# Patient Record
Sex: FEMALE | Race: WHITE | Employment: OTHER | URBAN - METROPOLITAN AREA
[De-identification: names, ages, dates, MRNs, and addresses within clinical notes are randomized per-mention and may not be internally consistent; named-entity substitution may affect disease eponyms.]

---

## 2017-02-16 ENCOUNTER — HOSPITAL ENCOUNTER (OUTPATIENT)
Dept: GENERAL RADIOLOGY | Facility: HOSPITAL | Age: 77
Discharge: HOME | End: 2017-02-16
Attending: ORTHOPAEDIC SURGERY
Payer: MEDICARE

## 2017-02-16 DIAGNOSIS — Z96.611 HISTORY OF TOTAL SHOULDER REPLACEMENT, RIGHT: ICD-10-CM

## 2017-02-16 PROCEDURE — 73030 X-RAY EXAM OF SHOULDER: CPT

## 2017-08-03 ENCOUNTER — HOSPITAL ENCOUNTER (OUTPATIENT)
Dept: GENERAL RADIOLOGY | Facility: HOSPITAL | Age: 77
Discharge: HOME | End: 2017-08-03
Attending: ORTHOPAEDIC SURGERY
Payer: MEDICARE

## 2017-08-03 DIAGNOSIS — Z96.611 H/O TOTAL SHOULDER REPLACEMENT, RIGHT: ICD-10-CM

## 2017-08-03 PROCEDURE — 73030 X-RAY EXAM OF SHOULDER: CPT | Performed by: ORTHOPAEDIC SURGERY

## 2018-10-25 ENCOUNTER — HOSPITAL ENCOUNTER (OUTPATIENT)
Dept: GENERAL RADIOLOGY | Facility: HOSPITAL | Age: 78
Discharge: HOME | End: 2018-10-25
Attending: ORTHOPAEDIC SURGERY
Payer: MEDICARE

## 2018-10-25 DIAGNOSIS — Z96.611 H/O TOTAL SHOULDER REPLACEMENT, RIGHT: ICD-10-CM

## 2018-10-25 PROCEDURE — 73030 X-RAY EXAM OF SHOULDER: CPT | Performed by: ORTHOPAEDIC SURGERY

## 2021-06-15 ENCOUNTER — IMAGING SERVICES (OUTPATIENT)
Dept: OTHER | Age: 81
End: 2021-06-15

## 2022-04-27 ENCOUNTER — HOSPITAL ENCOUNTER (INPATIENT)
Age: 82
LOS: 3 days | Discharge: HOME | DRG: 390 | End: 2022-04-30
Attending: EMERGENCY MEDICINE | Admitting: INTERNAL MEDICINE

## 2022-04-27 ENCOUNTER — APPOINTMENT (OUTPATIENT)
Dept: CT IMAGING | Age: 82
DRG: 390 | End: 2022-04-27
Attending: EMERGENCY MEDICINE

## 2022-04-27 ENCOUNTER — APPOINTMENT (OUTPATIENT)
Dept: GENERAL RADIOLOGY | Age: 82
DRG: 390 | End: 2022-04-27
Attending: EMERGENCY MEDICINE

## 2022-04-27 DIAGNOSIS — K56.609 INTESTINAL OBSTRUCTION, UNSPECIFIED CAUSE, UNSPECIFIED WHETHER PARTIAL OR COMPLETE (CMD): Primary | ICD-10-CM

## 2022-04-27 LAB
ALBUMIN SERPL-MCNC: 4.1 G/DL (ref 3.6–5.1)
ALBUMIN/GLOB SERPL: 1.2 {RATIO} (ref 1–2.4)
ALP SERPL-CCNC: 66 UNITS/L (ref 45–117)
ALT SERPL-CCNC: 40 UNITS/L
ANION GAP SERPL CALC-SCNC: 14 MMOL/L (ref 10–20)
AST SERPL-CCNC: 26 UNITS/L
BASOPHILS # BLD: 0 K/MCL (ref 0–0.3)
BASOPHILS NFR BLD: 1 %
BILIRUB SERPL-MCNC: 0.4 MG/DL (ref 0.2–1)
BUN SERPL-MCNC: 21 MG/DL (ref 6–20)
BUN/CREAT SERPL: 27 (ref 7–25)
CALCIUM SERPL-MCNC: 9.3 MG/DL (ref 8.4–10.2)
CHLORIDE SERPL-SCNC: 103 MMOL/L (ref 98–107)
CO2 SERPL-SCNC: 26 MMOL/L (ref 21–32)
CREAT SERPL-MCNC: 0.78 MG/DL (ref 0.51–0.95)
DEPRECATED RDW RBC: 41.9 FL (ref 39–50)
EOSINOPHIL # BLD: 0.1 K/MCL (ref 0–0.5)
EOSINOPHIL NFR BLD: 2 %
ERYTHROCYTE [DISTWIDTH] IN BLOOD: 12.1 % (ref 11–15)
FASTING DURATION TIME PATIENT: ABNORMAL H
GFR SERPLBLD BASED ON 1.73 SQ M-ARVRAT: 77 ML/MIN
GLOBULIN SER-MCNC: 3.4 G/DL (ref 2–4)
GLUCOSE SERPL-MCNC: 134 MG/DL (ref 70–99)
HCT VFR BLD CALC: 42.3 % (ref 36–46.5)
HGB BLD-MCNC: 14.7 G/DL (ref 12–15.5)
IMM GRANULOCYTES # BLD AUTO: 0 K/MCL (ref 0–0.2)
IMM GRANULOCYTES # BLD: 0 %
LACTATE BLDV-SCNC: 0.7 MMOL/L (ref 0–2)
LIPASE SERPL-CCNC: 94 UNITS/L (ref 73–393)
LYMPHOCYTES # BLD: 1.9 K/MCL (ref 1–4)
LYMPHOCYTES NFR BLD: 30 %
MCH RBC QN AUTO: 32.5 PG (ref 26–34)
MCHC RBC AUTO-ENTMCNC: 34.8 G/DL (ref 32–36.5)
MCV RBC AUTO: 93.4 FL (ref 78–100)
MONOCYTES # BLD: 0.5 K/MCL (ref 0.3–0.9)
MONOCYTES NFR BLD: 8 %
NEUTROPHILS # BLD: 3.8 K/MCL (ref 1.8–7.7)
NEUTROPHILS NFR BLD: 59 %
NRBC BLD MANUAL-RTO: 0 /100 WBC
PLATELET # BLD AUTO: 156 K/MCL (ref 140–450)
POTASSIUM SERPL-SCNC: 4.2 MMOL/L (ref 3.4–5.1)
PROT SERPL-MCNC: 7.5 G/DL (ref 6.4–8.2)
RBC # BLD: 4.53 MIL/MCL (ref 4–5.2)
SODIUM SERPL-SCNC: 139 MMOL/L (ref 135–145)
WBC # BLD: 6.4 K/MCL (ref 4.2–11)

## 2022-04-27 PROCEDURE — 93005 ELECTROCARDIOGRAM TRACING: CPT | Performed by: EMERGENCY MEDICINE

## 2022-04-27 PROCEDURE — 10006031 HB ROOM CHARGE TELEMETRY

## 2022-04-27 PROCEDURE — 83605 ASSAY OF LACTIC ACID: CPT | Performed by: EMERGENCY MEDICINE

## 2022-04-27 PROCEDURE — 10002807 HB RX 258: Performed by: EMERGENCY MEDICINE

## 2022-04-27 PROCEDURE — 96374 THER/PROPH/DIAG INJ IV PUSH: CPT

## 2022-04-27 PROCEDURE — G1004 CDSM NDSC: HCPCS

## 2022-04-27 PROCEDURE — 74176 CT ABD & PELVIS W/O CONTRAST: CPT

## 2022-04-27 PROCEDURE — 93010 ELECTROCARDIOGRAM REPORT: CPT | Performed by: INTERNAL MEDICINE

## 2022-04-27 PROCEDURE — 74018 RADEX ABDOMEN 1 VIEW: CPT

## 2022-04-27 PROCEDURE — 99285 EMERGENCY DEPT VISIT HI MDM: CPT

## 2022-04-27 PROCEDURE — U0003 INFECTIOUS AGENT DETECTION BY NUCLEIC ACID (DNA OR RNA); SEVERE ACUTE RESPIRATORY SYNDROME CORONAVIRUS 2 (SARS-COV-2) (CORONAVIRUS DISEASE [COVID-19]), AMPLIFIED PROBE TECHNIQUE, MAKING USE OF HIGH THROUGHPUT TECHNOLOGIES AS DESCRIBED BY CMS-2020-01-R: HCPCS | Performed by: EMERGENCY MEDICINE

## 2022-04-27 PROCEDURE — 83690 ASSAY OF LIPASE: CPT | Performed by: EMERGENCY MEDICINE

## 2022-04-27 PROCEDURE — 10002800 HB RX 250 W HCPCS: Performed by: EMERGENCY MEDICINE

## 2022-04-27 PROCEDURE — 96375 TX/PRO/DX INJ NEW DRUG ADDON: CPT

## 2022-04-27 PROCEDURE — 85025 COMPLETE CBC W/AUTO DIFF WBC: CPT | Performed by: EMERGENCY MEDICINE

## 2022-04-27 PROCEDURE — 80053 COMPREHEN METABOLIC PANEL: CPT | Performed by: EMERGENCY MEDICINE

## 2022-04-27 RX ORDER — ONDANSETRON 2 MG/ML
4 INJECTION INTRAMUSCULAR; INTRAVENOUS 2 TIMES DAILY PRN
Status: DISCONTINUED | OUTPATIENT
Start: 2022-04-27 | End: 2022-04-27

## 2022-04-27 RX ORDER — ONDANSETRON 2 MG/ML
4 INJECTION INTRAMUSCULAR; INTRAVENOUS ONCE
Status: COMPLETED | OUTPATIENT
Start: 2022-04-27 | End: 2022-04-27

## 2022-04-27 RX ORDER — ONDANSETRON 2 MG/ML
4 INJECTION INTRAMUSCULAR; INTRAVENOUS EVERY 4 HOURS PRN
Status: DISCONTINUED | OUTPATIENT
Start: 2022-04-27 | End: 2022-04-30 | Stop reason: HOSPADM

## 2022-04-27 RX ORDER — ONDANSETRON 2 MG/ML
4 INJECTION INTRAMUSCULAR; INTRAVENOUS EVERY 6 HOURS PRN
Status: DISCONTINUED | OUTPATIENT
Start: 2022-04-27 | End: 2022-04-27

## 2022-04-27 RX ORDER — SODIUM CHLORIDE 9 MG/ML
INJECTION, SOLUTION INTRAVENOUS CONTINUOUS
Status: DISCONTINUED | OUTPATIENT
Start: 2022-04-27 | End: 2022-04-30 | Stop reason: HOSPADM

## 2022-04-27 RX ADMIN — SODIUM CHLORIDE 1000 ML: 9 INJECTION, SOLUTION INTRAVENOUS at 22:18

## 2022-04-27 RX ADMIN — MORPHINE SULFATE 2 MG: 2 INJECTION, SOLUTION INTRAMUSCULAR; INTRAVENOUS at 22:06

## 2022-04-27 RX ADMIN — ONDANSETRON 4 MG: 2 INJECTION INTRAMUSCULAR; INTRAVENOUS at 21:20

## 2022-04-27 RX ADMIN — SODIUM CHLORIDE 1000 ML: 9 INJECTION, SOLUTION INTRAVENOUS at 22:16

## 2022-04-27 ASSESSMENT — ENCOUNTER SYMPTOMS
CHEST TIGHTNESS: 0
BLOOD IN STOOL: 0
HEMATOLOGIC/LYMPHATIC NEGATIVE: 1
CONSTIPATION: 0
SHORTNESS OF BREATH: 0
HEADACHES: 0
DIARRHEA: 0
NEUROLOGICAL NEGATIVE: 1
PSYCHIATRIC NEGATIVE: 1
NAUSEA: 1
ENDOCRINE NEGATIVE: 1
ALLERGIC/IMMUNOLOGIC NEGATIVE: 1
ABDOMINAL PAIN: 1
BACK PAIN: 0
CONSTITUTIONAL NEGATIVE: 1
VOMITING: 1
RESPIRATORY NEGATIVE: 1
WEAKNESS: 0
EYES NEGATIVE: 1

## 2022-04-27 ASSESSMENT — LIFESTYLE VARIABLES
ALCOHOL_USE_STATUS: NO OR LOW RISK WITH VALIDATED TOOL
HOW OFTEN DO YOU HAVE A DRINK CONTAINING ALCOHOL: NEVER
HOW OFTEN DO YOU HAVE 6 OR MORE DRINKS ON ONE OCCASION: NEVER

## 2022-04-27 ASSESSMENT — ACTIVITIES OF DAILY LIVING (ADL)
ADL_BEFORE_ADMISSION: INDEPENDENT
CHRONIC_PAIN_PRESENT: NO
RECENT_DECLINE_ADL: NO
ADL_SCORE: 12
ADL_SHORT_OF_BREATH: NO

## 2022-04-27 ASSESSMENT — PAIN SCALES - GENERAL: PAINLEVEL_OUTOF10: 9

## 2022-04-27 ASSESSMENT — COGNITIVE AND FUNCTIONAL STATUS - GENERAL: ARE YOU DEAF OR DO YOU HAVE SERIOUS DIFFICULTY  HEARING: NO

## 2022-04-28 PROBLEM — K59.01 SLOW TRANSIT CONSTIPATION: Status: ACTIVE | Noted: 2022-04-28

## 2022-04-28 LAB
ALBUMIN SERPL-MCNC: 3.1 G/DL (ref 3.6–5.1)
ALBUMIN/GLOB SERPL: 1.1 {RATIO} (ref 1–2.4)
ALP SERPL-CCNC: 50 UNITS/L (ref 45–117)
ALT SERPL-CCNC: 32 UNITS/L
ANION GAP SERPL CALC-SCNC: 11 MMOL/L (ref 10–20)
APPEARANCE UR: CLEAR
AST SERPL-CCNC: 24 UNITS/L
BACTERIA #/AREA URNS HPF: ABNORMAL /HPF
BILIRUB SERPL-MCNC: 0.5 MG/DL (ref 0.2–1)
BILIRUB UR QL STRIP: NEGATIVE
BUN SERPL-MCNC: 17 MG/DL (ref 6–20)
BUN/CREAT SERPL: 26 (ref 7–25)
CALCIUM SERPL-MCNC: 8.2 MG/DL (ref 8.4–10.2)
CHLORIDE SERPL-SCNC: 107 MMOL/L (ref 98–107)
CO2 SERPL-SCNC: 24 MMOL/L (ref 21–32)
COLOR UR: YELLOW
CREAT SERPL-MCNC: 0.66 MG/DL (ref 0.51–0.95)
DEPRECATED RDW RBC: 41.6 FL (ref 39–50)
ERYTHROCYTE [DISTWIDTH] IN BLOOD: 12.2 % (ref 11–15)
FASTING DURATION TIME PATIENT: ABNORMAL H
GFR SERPLBLD BASED ON 1.73 SQ M-ARVRAT: 89 ML/MIN
GLOBULIN SER-MCNC: 2.9 G/DL (ref 2–4)
GLUCOSE SERPL-MCNC: 118 MG/DL (ref 70–99)
GLUCOSE UR STRIP-MCNC: NEGATIVE MG/DL
HCT VFR BLD CALC: 39.3 % (ref 36–46.5)
HGB BLD-MCNC: 13.4 G/DL (ref 12–15.5)
HGB UR QL STRIP: NEGATIVE
HYALINE CASTS #/AREA URNS LPF: ABNORMAL /LPF
KETONES UR STRIP-MCNC: NEGATIVE MG/DL
LEUKOCYTE ESTERASE UR QL STRIP: ABNORMAL
MAGNESIUM SERPL-MCNC: 1.9 MG/DL (ref 1.7–2.4)
MCH RBC QN AUTO: 31.5 PG (ref 26–34)
MCHC RBC AUTO-ENTMCNC: 34.1 G/DL (ref 32–36.5)
MCV RBC AUTO: 92.5 FL (ref 78–100)
NITRITE UR QL STRIP: NEGATIVE
NRBC BLD MANUAL-RTO: 0 /100 WBC
PH UR STRIP: 6 [PH] (ref 5–7)
PHOSPHATE SERPL-MCNC: 3.9 MG/DL (ref 2.4–4.7)
PLATELET # BLD AUTO: 154 K/MCL (ref 140–450)
POTASSIUM SERPL-SCNC: 4.2 MMOL/L (ref 3.4–5.1)
PROT SERPL-MCNC: 6 G/DL (ref 6.4–8.2)
PROT UR STRIP-MCNC: NEGATIVE MG/DL
RBC # BLD: 4.25 MIL/MCL (ref 4–5.2)
RBC #/AREA URNS HPF: ABNORMAL /HPF
SARS-COV-2 RNA RESP QL NAA+PROBE: NOT DETECTED
SERVICE CMNT-IMP: NORMAL
SERVICE CMNT-IMP: NORMAL
SODIUM SERPL-SCNC: 138 MMOL/L (ref 135–145)
SP GR UR STRIP: 1.01 (ref 1–1.03)
SQUAMOUS #/AREA URNS HPF: ABNORMAL /HPF
UROBILINOGEN UR STRIP-MCNC: 0.2 MG/DL
WBC # BLD: 6.9 K/MCL (ref 4.2–11)
WBC #/AREA URNS HPF: ABNORMAL /HPF

## 2022-04-28 PROCEDURE — 84100 ASSAY OF PHOSPHORUS: CPT | Performed by: INTERNAL MEDICINE

## 2022-04-28 PROCEDURE — 36415 COLL VENOUS BLD VENIPUNCTURE: CPT | Performed by: INTERNAL MEDICINE

## 2022-04-28 PROCEDURE — 81001 URINALYSIS AUTO W/SCOPE: CPT | Performed by: EMERGENCY MEDICINE

## 2022-04-28 PROCEDURE — 10000002 HB ROOM CHARGE MED SURG

## 2022-04-28 PROCEDURE — 10002807 HB RX 258: Performed by: INTERNAL MEDICINE

## 2022-04-28 PROCEDURE — 83735 ASSAY OF MAGNESIUM: CPT | Performed by: INTERNAL MEDICINE

## 2022-04-28 PROCEDURE — 87086 URINE CULTURE/COLONY COUNT: CPT | Performed by: EMERGENCY MEDICINE

## 2022-04-28 PROCEDURE — 80053 COMPREHEN METABOLIC PANEL: CPT | Performed by: INTERNAL MEDICINE

## 2022-04-28 PROCEDURE — 85027 COMPLETE CBC AUTOMATED: CPT | Performed by: INTERNAL MEDICINE

## 2022-04-28 PROCEDURE — 99223 1ST HOSP IP/OBS HIGH 75: CPT | Performed by: INTERNAL MEDICINE

## 2022-04-28 PROCEDURE — 99497 ADVNCD CARE PLAN 30 MIN: CPT | Performed by: INTERNAL MEDICINE

## 2022-04-28 RX ORDER — MULTIVIT WITH MINERALS/LUTEIN
1000 TABLET ORAL 2 TIMES DAILY
COMMUNITY

## 2022-04-28 RX ORDER — CHLORAL HYDRATE 500 MG
1000 CAPSULE ORAL 2 TIMES DAILY
COMMUNITY

## 2022-04-28 RX ORDER — ANASTROZOLE 1 MG/1
1 TABLET ORAL DAILY
Status: ON HOLD | COMMUNITY
End: 2023-09-02

## 2022-04-28 RX ADMIN — SODIUM CHLORIDE: 9 INJECTION, SOLUTION INTRAVENOUS at 01:36

## 2022-04-28 RX ADMIN — SODIUM CHLORIDE: 9 INJECTION, SOLUTION INTRAVENOUS at 22:37

## 2022-04-28 ASSESSMENT — COLUMBIA-SUICIDE SEVERITY RATING SCALE - C-SSRS: IS THE PATIENT ABLE TO COMPLETE C-SSRS: YES

## 2022-04-28 ASSESSMENT — PATIENT HEALTH QUESTIONNAIRE - PHQ9
SUM OF ALL RESPONSES TO PHQ9 QUESTIONS 1 AND 2: 0
1. LITTLE INTEREST OR PLEASURE IN DOING THINGS: NOT AT ALL
CLINICAL INTERPRETATION OF PHQ2 SCORE: NO FURTHER SCREENING NEEDED
2. FEELING DOWN, DEPRESSED OR HOPELESS: NOT AT ALL
IS PATIENT ABLE TO COMPLETE PHQ2 OR PHQ9: YES
SUM OF ALL RESPONSES TO PHQ9 QUESTIONS 1 AND 2: 0

## 2022-04-28 ASSESSMENT — PAIN SCALES - GENERAL
PAINLEVEL_OUTOF10: 5
PAINLEVEL_OUTOF10: 1
PAINLEVEL_OUTOF10: 2

## 2022-04-29 LAB
BACTERIA UR CULT: NORMAL
P AXIS (DEGREES): 74
PR-INTERVAL (MSEC): 168
QRS-INTERVAL (MSEC): 84
QT-INTERVAL (MSEC): 392
QTC: 398
R AXIS (DEGREES): 62
REPORT TEXT: NORMAL
T AXIS (DEGREES): 60
VENTRICULAR RATE EKG/MIN (BPM): 62

## 2022-04-29 PROCEDURE — 99233 SBSQ HOSP IP/OBS HIGH 50: CPT | Performed by: INTERNAL MEDICINE

## 2022-04-29 PROCEDURE — 10002807 HB RX 258: Performed by: INTERNAL MEDICINE

## 2022-04-29 PROCEDURE — 10000002 HB ROOM CHARGE MED SURG

## 2022-04-29 PROCEDURE — 99232 SBSQ HOSP IP/OBS MODERATE 35: CPT | Performed by: SURGERY

## 2022-04-29 RX ADMIN — SODIUM CHLORIDE: 9 INJECTION, SOLUTION INTRAVENOUS at 23:00

## 2022-04-29 RX ADMIN — SODIUM CHLORIDE: 9 INJECTION, SOLUTION INTRAVENOUS at 11:07

## 2022-04-29 ASSESSMENT — PAIN SCALES - GENERAL
PAINLEVEL_OUTOF10: 0
PAINLEVEL_OUTOF10: 0

## 2022-04-30 VITALS
BODY MASS INDEX: 20.48 KG/M2 | SYSTOLIC BLOOD PRESSURE: 119 MMHG | DIASTOLIC BLOOD PRESSURE: 71 MMHG | HEIGHT: 66 IN | HEART RATE: 80 BPM | TEMPERATURE: 97.9 F | RESPIRATION RATE: 18 BRPM | WEIGHT: 127.43 LBS | OXYGEN SATURATION: 94 %

## 2022-04-30 PROCEDURE — 99239 HOSP IP/OBS DSCHRG MGMT >30: CPT | Performed by: INTERNAL MEDICINE

## 2022-04-30 ASSESSMENT — PAIN SCALES - GENERAL: PAINLEVEL_OUTOF10: 0

## 2022-05-27 ENCOUNTER — HOSPITAL ENCOUNTER (OUTPATIENT)
Dept: ULTRASOUND IMAGING | Age: 82
Discharge: HOME | End: 2022-05-27
Attending: FAMILY MEDICINE

## 2022-05-27 DIAGNOSIS — N28.9 KIDNEY LESION: ICD-10-CM

## 2022-05-27 PROCEDURE — 76775 US EXAM ABDO BACK WALL LIM: CPT

## 2022-08-18 ENCOUNTER — HOSPITAL ENCOUNTER (OUTPATIENT)
Age: 82
Setting detail: OBSERVATION
Discharge: HOME | End: 2022-08-19
Attending: EMERGENCY MEDICINE | Admitting: INTERNAL MEDICINE

## 2022-08-18 ENCOUNTER — APPOINTMENT (OUTPATIENT)
Dept: CARDIOLOGY | Age: 82
End: 2022-08-18
Attending: INTERNAL MEDICINE

## 2022-08-18 ENCOUNTER — APPOINTMENT (OUTPATIENT)
Dept: CT IMAGING | Age: 82
End: 2022-08-18
Attending: EMERGENCY MEDICINE

## 2022-08-18 ENCOUNTER — APPOINTMENT (OUTPATIENT)
Dept: GENERAL RADIOLOGY | Age: 82
End: 2022-08-18
Attending: EMERGENCY MEDICINE

## 2022-08-18 DIAGNOSIS — I48.91 ATRIAL FIBRILLATION, UNSPECIFIED TYPE (CMD): Primary | ICD-10-CM

## 2022-08-18 DIAGNOSIS — K59.01 SLOW TRANSIT CONSTIPATION: ICD-10-CM

## 2022-08-18 LAB
ALBUMIN SERPL-MCNC: 3.3 G/DL (ref 3.6–5.1)
ALBUMIN/GLOB SERPL: 1.1 {RATIO} (ref 1–2.4)
ALP SERPL-CCNC: 50 UNITS/L (ref 45–117)
ALT SERPL-CCNC: 28 UNITS/L
ANION GAP SERPL CALC-SCNC: 16 MMOL/L (ref 7–19)
APTT PPP: 23 SEC (ref 22–30)
AST SERPL-CCNC: 25 UNITS/L
BASOPHILS # BLD: 0 K/MCL (ref 0–0.3)
BASOPHILS NFR BLD: 1 %
BILIRUB SERPL-MCNC: 0.5 MG/DL (ref 0.2–1)
BUN SERPL-MCNC: 19 MG/DL (ref 6–20)
BUN/CREAT SERPL: 26 (ref 7–25)
CALCIUM SERPL-MCNC: 8.4 MG/DL (ref 8.4–10.2)
CHLORIDE SERPL-SCNC: 103 MMOL/L (ref 97–110)
CO2 SERPL-SCNC: 22 MMOL/L (ref 21–32)
CREAT SERPL-MCNC: 0.74 MG/DL (ref 0.51–0.95)
DEPRECATED RDW RBC: 42.6 FL (ref 39–50)
EOSINOPHIL # BLD: 0.2 K/MCL (ref 0–0.5)
EOSINOPHIL NFR BLD: 3 %
ERYTHROCYTE [DISTWIDTH] IN BLOOD: 12.1 % (ref 11–15)
FASTING DURATION TIME PATIENT: ABNORMAL H
GFR SERPLBLD BASED ON 1.73 SQ M-ARVRAT: 82 ML/MIN
GLOBULIN SER-MCNC: 3 G/DL (ref 2–4)
GLUCOSE SERPL-MCNC: 113 MG/DL (ref 70–99)
HCT VFR BLD CALC: 41.9 % (ref 36–46.5)
HGB BLD-MCNC: 14.2 G/DL (ref 12–15.5)
IMM GRANULOCYTES # BLD AUTO: 0 K/MCL (ref 0–0.2)
IMM GRANULOCYTES # BLD: 0 %
INR PPP: 1
LIPASE SERPL-CCNC: 116 UNITS/L (ref 73–393)
LYMPHOCYTES # BLD: 1.8 K/MCL (ref 1–4)
LYMPHOCYTES NFR BLD: 35 %
MCH RBC QN AUTO: 32.1 PG (ref 26–34)
MCHC RBC AUTO-ENTMCNC: 33.9 G/DL (ref 32–36.5)
MCV RBC AUTO: 94.8 FL (ref 78–100)
MONOCYTES # BLD: 0.4 K/MCL (ref 0.3–0.9)
MONOCYTES NFR BLD: 8 %
NEUTROPHILS # BLD: 2.7 K/MCL (ref 1.8–7.7)
NEUTROPHILS NFR BLD: 53 %
NRBC BLD MANUAL-RTO: 0 /100 WBC
NT-PROBNP SERPL-MCNC: 374 PG/ML
PLATELET # BLD AUTO: 151 K/MCL (ref 140–450)
POTASSIUM SERPL-SCNC: 3.6 MMOL/L (ref 3.4–5.1)
PROT SERPL-MCNC: 6.3 G/DL (ref 6.4–8.2)
PROTHROMBIN TIME: 10.8 SEC (ref 9.7–11.8)
QRS-INTERVAL (MSEC): 93
QT-INTERVAL (MSEC): 289
QTC: 369
R AXIS (DEGREES): 52
RBC # BLD: 4.42 MIL/MCL (ref 4–5.2)
REPORT TEXT: NORMAL
SARS-COV-2 RNA RESP QL NAA+PROBE: NOT DETECTED
SERVICE CMNT-IMP: NORMAL
SERVICE CMNT-IMP: NORMAL
SODIUM SERPL-SCNC: 137 MMOL/L (ref 135–145)
T AXIS (DEGREES): 48
TROPONIN I SERPL DL<=0.01 NG/ML-MCNC: 33 NG/L
VENTRICULAR RATE EKG/MIN (BPM): 137
WBC # BLD: 5.1 K/MCL (ref 4.2–11)

## 2022-08-18 PROCEDURE — 96361 HYDRATE IV INFUSION ADD-ON: CPT

## 2022-08-18 PROCEDURE — G1004 CDSM NDSC: HCPCS

## 2022-08-18 PROCEDURE — 93306 TTE W/DOPPLER COMPLETE: CPT

## 2022-08-18 PROCEDURE — 83690 ASSAY OF LIPASE: CPT | Performed by: EMERGENCY MEDICINE

## 2022-08-18 PROCEDURE — 10004651 HB RX, NO CHARGE ITEM: Performed by: EMERGENCY MEDICINE

## 2022-08-18 PROCEDURE — 96366 THER/PROPH/DIAG IV INF ADDON: CPT

## 2022-08-18 PROCEDURE — G0378 HOSPITAL OBSERVATION PER HR: HCPCS

## 2022-08-18 PROCEDURE — 84484 ASSAY OF TROPONIN QUANT: CPT | Performed by: EMERGENCY MEDICINE

## 2022-08-18 PROCEDURE — 83880 ASSAY OF NATRIURETIC PEPTIDE: CPT | Performed by: NURSE PRACTITIONER

## 2022-08-18 PROCEDURE — 85730 THROMBOPLASTIN TIME PARTIAL: CPT | Performed by: EMERGENCY MEDICINE

## 2022-08-18 PROCEDURE — 85025 COMPLETE CBC W/AUTO DIFF WBC: CPT | Performed by: EMERGENCY MEDICINE

## 2022-08-18 PROCEDURE — 96376 TX/PRO/DX INJ SAME DRUG ADON: CPT

## 2022-08-18 PROCEDURE — 80053 COMPREHEN METABOLIC PANEL: CPT | Performed by: EMERGENCY MEDICINE

## 2022-08-18 PROCEDURE — 85610 PROTHROMBIN TIME: CPT | Performed by: EMERGENCY MEDICINE

## 2022-08-18 PROCEDURE — 93005 ELECTROCARDIOGRAM TRACING: CPT | Performed by: EMERGENCY MEDICINE

## 2022-08-18 PROCEDURE — 99285 EMERGENCY DEPT VISIT HI MDM: CPT

## 2022-08-18 PROCEDURE — 10002803 HB RX 637: Performed by: INTERNAL MEDICINE

## 2022-08-18 PROCEDURE — 10002807 HB RX 258: Performed by: EMERGENCY MEDICINE

## 2022-08-18 PROCEDURE — 96365 THER/PROPH/DIAG IV INF INIT: CPT

## 2022-08-18 PROCEDURE — 93306 TTE W/DOPPLER COMPLETE: CPT | Performed by: INTERNAL MEDICINE

## 2022-08-18 PROCEDURE — 10002803 HB RX 637: Performed by: STUDENT IN AN ORGANIZED HEALTH CARE EDUCATION/TRAINING PROGRAM

## 2022-08-18 PROCEDURE — 71045 X-RAY EXAM CHEST 1 VIEW: CPT

## 2022-08-18 PROCEDURE — 10002801 HB RX 250 W/O HCPCS: Performed by: EMERGENCY MEDICINE

## 2022-08-18 PROCEDURE — 70450 CT HEAD/BRAIN W/O DYE: CPT

## 2022-08-18 PROCEDURE — 99205 OFFICE O/P NEW HI 60 MIN: CPT | Performed by: INTERNAL MEDICINE

## 2022-08-18 PROCEDURE — 99220 INITIAL OBSERVATION CARE,LEVL III: CPT | Performed by: STUDENT IN AN ORGANIZED HEALTH CARE EDUCATION/TRAINING PROGRAM

## 2022-08-18 PROCEDURE — 87635 SARS-COV-2 COVID-19 AMP PRB: CPT | Performed by: EMERGENCY MEDICINE

## 2022-08-18 PROCEDURE — 96374 THER/PROPH/DIAG INJ IV PUSH: CPT

## 2022-08-18 RX ORDER — ANTIARTHRITIC COMBINATION NO.2 900 MG
25 TABLET ORAL DAILY
Status: ON HOLD | COMMUNITY
End: 2023-09-03 | Stop reason: HOSPADM

## 2022-08-18 RX ORDER — ASCORBIC ACID 500 MG
500 TABLET ORAL DAILY
Status: DISCONTINUED | OUTPATIENT
Start: 2022-08-18 | End: 2022-08-19 | Stop reason: HOSPADM

## 2022-08-18 RX ORDER — EVENING PRIMROSE OIL 500 MG
500 CAPSULE ORAL 4 TIMES DAILY
COMMUNITY

## 2022-08-18 RX ORDER — ACETAMINOPHEN 325 MG/1
650 TABLET ORAL EVERY 4 HOURS PRN
Status: DISCONTINUED | OUTPATIENT
Start: 2022-08-18 | End: 2022-08-19 | Stop reason: HOSPADM

## 2022-08-18 RX ORDER — PYRIDOXINE HCL (VITAMIN B6) 50 MG
100 TABLET ORAL 2 TIMES DAILY
Status: DISCONTINUED | OUTPATIENT
Start: 2022-08-18 | End: 2022-08-19 | Stop reason: HOSPADM

## 2022-08-18 RX ORDER — MAGNESIUM HYDROXIDE/ALUMINUM HYDROXICE/SIMETHICONE 120; 1200; 1200 MG/30ML; MG/30ML; MG/30ML
30 SUSPENSION ORAL EVERY 4 HOURS PRN
Status: DISCONTINUED | OUTPATIENT
Start: 2022-08-18 | End: 2022-08-19 | Stop reason: HOSPADM

## 2022-08-18 RX ORDER — ANASTROZOLE 1 MG/1
1 TABLET ORAL DAILY
Status: DISCONTINUED | OUTPATIENT
Start: 2022-08-18 | End: 2022-08-19 | Stop reason: HOSPADM

## 2022-08-18 RX ORDER — MULTIVITAMIN WITH IRON
100 TABLET ORAL 2 TIMES DAILY
COMMUNITY

## 2022-08-18 RX ORDER — ENOXAPARIN SODIUM 100 MG/ML
40 INJECTION SUBCUTANEOUS EVERY 24 HOURS
Status: DISCONTINUED | OUTPATIENT
Start: 2022-08-18 | End: 2022-08-18

## 2022-08-18 RX ORDER — AMOXICILLIN 250 MG
2 CAPSULE ORAL DAILY PRN
Status: DISCONTINUED | OUTPATIENT
Start: 2022-08-18 | End: 2022-08-19 | Stop reason: HOSPADM

## 2022-08-18 RX ORDER — LANOLIN ALCOHOL/MO/W.PET/CERES
3 CREAM (GRAM) TOPICAL NIGHTLY PRN
Status: DISCONTINUED | OUTPATIENT
Start: 2022-08-18 | End: 2022-08-19 | Stop reason: HOSPADM

## 2022-08-18 RX ORDER — POLYETHYLENE GLYCOL 3350 17 G/17G
17 POWDER, FOR SOLUTION ORAL DAILY PRN
Status: DISCONTINUED | OUTPATIENT
Start: 2022-08-18 | End: 2022-08-19 | Stop reason: HOSPADM

## 2022-08-18 RX ORDER — ONDANSETRON 2 MG/ML
4 INJECTION INTRAMUSCULAR; INTRAVENOUS 4 TIMES DAILY PRN
Status: DISCONTINUED | OUTPATIENT
Start: 2022-08-18 | End: 2022-08-19 | Stop reason: HOSPADM

## 2022-08-18 RX ORDER — CALCIUM CARBONATE 500 MG/1
500 TABLET, CHEWABLE ORAL EVERY 4 HOURS PRN
Status: DISCONTINUED | OUTPATIENT
Start: 2022-08-18 | End: 2022-08-19 | Stop reason: HOSPADM

## 2022-08-18 RX ORDER — 0.9 % SODIUM CHLORIDE 0.9 %
2 VIAL (ML) INJECTION EVERY 12 HOURS SCHEDULED
Status: DISCONTINUED | OUTPATIENT
Start: 2022-08-18 | End: 2022-08-19 | Stop reason: HOSPADM

## 2022-08-18 RX ADMIN — SODIUM CHLORIDE, PRESERVATIVE FREE 2 ML: 5 INJECTION INTRAVENOUS at 20:50

## 2022-08-18 RX ADMIN — RIVAROXABAN 20 MG: 20 TABLET, FILM COATED ORAL at 18:03

## 2022-08-18 RX ADMIN — SODIUM CHLORIDE, PRESERVATIVE FREE 2 ML: 5 INJECTION INTRAVENOUS at 10:23

## 2022-08-18 RX ADMIN — SODIUM CHLORIDE 1000 ML: 9 INJECTION, SOLUTION INTRAVENOUS at 05:16

## 2022-08-18 RX ADMIN — Medication 5 MG/HR: at 20:53

## 2022-08-18 RX ADMIN — Medication 5 MG/HR: at 05:12

## 2022-08-18 RX ADMIN — Medication 100 MG: at 20:50

## 2022-08-18 ASSESSMENT — ENCOUNTER SYMPTOMS
VOMITING: 0
COUGH: 0
SORE THROAT: 0
FEVER: 0
RHINORRHEA: 0
HEADACHES: 0
CHILLS: 0
NAUSEA: 0
HEADACHES: 1
NERVOUS/ANXIOUS: 1
CONFUSION: 0
NUMBNESS: 1
ABDOMINAL PAIN: 0
WOUND: 0
SHORTNESS OF BREATH: 0
SHORTNESS OF BREATH: 1

## 2022-08-18 ASSESSMENT — COGNITIVE AND FUNCTIONAL STATUS - GENERAL
ARE YOU BLIND OR DO YOU HAVE SERIOUS DIFFICULTY SEEING, EVEN WHEN WEARING GLASSES: NO
DO YOU HAVE SERIOUS DIFFICULTY WALKING OR CLIMBING STAIRS: NO
DO YOU HAVE DIFFICULTY DRESSING OR BATHING: NO
BECAUSE OF A PHYSICAL, MENTAL, OR EMOTIONAL CONDITION, DO YOU HAVE DIFFICULTY DOING ERRANDS ALONE: NO
ARE YOU DEAF OR DO YOU HAVE SERIOUS DIFFICULTY  HEARING: NO
BECAUSE OF A PHYSICAL, MENTAL, OR EMOTIONAL CONDITION, DO YOU HAVE SERIOUS DIFFICULTY CONCENTRATING, REMEMBERING OR MAKING DECISIONS: NO

## 2022-08-18 ASSESSMENT — PAIN SCALES - GENERAL
PAINLEVEL_OUTOF10: 3
PAINLEVEL_OUTOF10: 0
PAINLEVEL_OUTOF10: 0

## 2022-08-18 ASSESSMENT — ACTIVITIES OF DAILY LIVING (ADL)
ADL_BEFORE_ADMISSION: INDEPENDENT
RECENT_DECLINE_ADL: NO
ADL_SCORE: 12
ADL_SHORT_OF_BREATH: NO
CHRONIC_PAIN_PRESENT: NO

## 2022-08-18 ASSESSMENT — LIFESTYLE VARIABLES
HOW MANY STANDARD DRINKS CONTAINING ALCOHOL DO YOU HAVE ON A TYPICAL DAY: 0,1 OR 2
HOW OFTEN DO YOU HAVE A DRINK CONTAINING ALCOHOL: NEVER
AUDIT-C TOTAL SCORE: 0
HOW OFTEN DO YOU HAVE 6 OR MORE DRINKS ON ONE OCCASION: NEVER
ALCOHOL_USE_STATUS: NO OR LOW RISK WITH VALIDATED TOOL

## 2022-08-18 ASSESSMENT — PAIN DESCRIPTION - PAIN TYPE: TYPE: ACUTE PAIN

## 2022-08-19 ENCOUNTER — APPOINTMENT (OUTPATIENT)
Dept: SURGERY | Age: 82
End: 2022-08-19
Attending: INTERNAL MEDICINE

## 2022-08-19 ENCOUNTER — ANESTHESIA (OUTPATIENT)
Dept: SURGERY | Age: 82
End: 2022-08-19

## 2022-08-19 ENCOUNTER — ANESTHESIA EVENT (OUTPATIENT)
Dept: SURGERY | Age: 82
End: 2022-08-19

## 2022-08-19 VITALS
BODY MASS INDEX: 19.56 KG/M2 | WEIGHT: 121.69 LBS | HEIGHT: 66 IN | TEMPERATURE: 97.9 F | HEART RATE: 57 BPM | RESPIRATION RATE: 16 BRPM | OXYGEN SATURATION: 98 % | DIASTOLIC BLOOD PRESSURE: 57 MMHG | SYSTOLIC BLOOD PRESSURE: 106 MMHG

## 2022-08-19 LAB
ANION GAP SERPL CALC-SCNC: 11 MMOL/L (ref 7–19)
BASOPHILS # BLD: 0.1 K/MCL (ref 0–0.3)
BASOPHILS NFR BLD: 1 %
BUN SERPL-MCNC: 16 MG/DL (ref 6–20)
BUN/CREAT SERPL: 24 (ref 7–25)
CALCIUM SERPL-MCNC: 8 MG/DL (ref 8.4–10.2)
CHLORIDE SERPL-SCNC: 107 MMOL/L (ref 97–110)
CO2 SERPL-SCNC: 23 MMOL/L (ref 21–32)
CREAT SERPL-MCNC: 0.66 MG/DL (ref 0.51–0.95)
DEPRECATED RDW RBC: 42.9 FL (ref 39–50)
EOSINOPHIL # BLD: 0.1 K/MCL (ref 0–0.5)
EOSINOPHIL NFR BLD: 3 %
ERYTHROCYTE [DISTWIDTH] IN BLOOD: 12.8 % (ref 11–15)
FASTING DURATION TIME PATIENT: ABNORMAL H
GFR SERPLBLD BASED ON 1.73 SQ M-ARVRAT: 89 ML/MIN
GLUCOSE SERPL-MCNC: 91 MG/DL (ref 70–99)
HCT VFR BLD CALC: 37.9 % (ref 36–46.5)
HGB BLD-MCNC: 13 G/DL (ref 12–15.5)
IMM GRANULOCYTES # BLD AUTO: 0 K/MCL (ref 0–0.2)
IMM GRANULOCYTES # BLD: 0 %
LYMPHOCYTES # BLD: 2.1 K/MCL (ref 1–4)
LYMPHOCYTES NFR BLD: 37 %
MCH RBC QN AUTO: 31.6 PG (ref 26–34)
MCHC RBC AUTO-ENTMCNC: 34.3 G/DL (ref 32–36.5)
MCV RBC AUTO: 92 FL (ref 78–100)
MONOCYTES # BLD: 0.5 K/MCL (ref 0.3–0.9)
MONOCYTES NFR BLD: 10 %
NEUTROPHILS # BLD: 2.8 K/MCL (ref 1.8–7.7)
NEUTROPHILS NFR BLD: 49 %
NRBC BLD MANUAL-RTO: 0 /100 WBC
PLATELET # BLD AUTO: 156 K/MCL (ref 140–450)
POTASSIUM SERPL-SCNC: 4.2 MMOL/L (ref 3.4–5.1)
RBC # BLD: 4.12 MIL/MCL (ref 4–5.2)
SODIUM SERPL-SCNC: 137 MMOL/L (ref 135–145)
WBC # BLD: 5.6 K/MCL (ref 4.2–11)

## 2022-08-19 PROCEDURE — 93320 DOPPLER ECHO COMPLETE: CPT | Performed by: INTERNAL MEDICINE

## 2022-08-19 PROCEDURE — G0378 HOSPITAL OBSERVATION PER HR: HCPCS

## 2022-08-19 PROCEDURE — 99217 OBSERVATION CARE DISCHARGE: CPT | Performed by: INTERNAL MEDICINE

## 2022-08-19 PROCEDURE — 10004651 HB RX, NO CHARGE ITEM: Performed by: EMERGENCY MEDICINE

## 2022-08-19 PROCEDURE — 96366 THER/PROPH/DIAG IV INF ADDON: CPT

## 2022-08-19 PROCEDURE — 13000008 HB ANESTHESIA MAC OUTSIDE OR

## 2022-08-19 PROCEDURE — 93312 ECHO TRANSESOPHAGEAL: CPT | Performed by: INTERNAL MEDICINE

## 2022-08-19 PROCEDURE — 13000001 HB PHASE II RECOVERY EA 30 MINUTES

## 2022-08-19 PROCEDURE — 10002800 HB RX 250 W HCPCS

## 2022-08-19 PROCEDURE — 10002807 HB RX 258

## 2022-08-19 PROCEDURE — 10002801 HB RX 250 W/O HCPCS: Performed by: INTERNAL MEDICINE

## 2022-08-19 PROCEDURE — 10002803 HB RX 637: Performed by: STUDENT IN AN ORGANIZED HEALTH CARE EDUCATION/TRAINING PROGRAM

## 2022-08-19 PROCEDURE — 10002801 HB RX 250 W/O HCPCS

## 2022-08-19 PROCEDURE — 93312 ECHO TRANSESOPHAGEAL: CPT

## 2022-08-19 PROCEDURE — 80048 BASIC METABOLIC PNL TOTAL CA: CPT | Performed by: INTERNAL MEDICINE

## 2022-08-19 PROCEDURE — 93325 DOPPLER ECHO COLOR FLOW MAPG: CPT | Performed by: INTERNAL MEDICINE

## 2022-08-19 PROCEDURE — 85025 COMPLETE CBC W/AUTO DIFF WBC: CPT | Performed by: INTERNAL MEDICINE

## 2022-08-19 PROCEDURE — 36415 COLL VENOUS BLD VENIPUNCTURE: CPT | Performed by: INTERNAL MEDICINE

## 2022-08-19 PROCEDURE — 10002803 HB RX 637: Performed by: INTERNAL MEDICINE

## 2022-08-19 RX ORDER — LIDOCAINE HYDROCHLORIDE 20 MG/ML
INJECTION, SOLUTION INFILTRATION; PERINEURAL PRN
Status: DISCONTINUED | OUTPATIENT
Start: 2022-08-19 | End: 2022-08-19

## 2022-08-19 RX ORDER — DILTIAZEM HYDROCHLORIDE 120 MG/1
120 CAPSULE, EXTENDED RELEASE ORAL DAILY
Qty: 30 CAPSULE | Refills: 11 | Status: SHIPPED | OUTPATIENT
Start: 2022-08-19 | End: 2023-09-02

## 2022-08-19 RX ORDER — DILTIAZEM HYDROCHLORIDE 180 MG/1
180 CAPSULE, EXTENDED RELEASE ORAL DAILY
Status: DISCONTINUED | OUTPATIENT
Start: 2022-08-19 | End: 2022-08-19 | Stop reason: HOSPADM

## 2022-08-19 RX ORDER — PROPOFOL 10 MG/ML
INJECTION, EMULSION INTRAVENOUS PRN
Status: DISCONTINUED | OUTPATIENT
Start: 2022-08-19 | End: 2022-08-19

## 2022-08-19 RX ORDER — SODIUM CHLORIDE 9 MG/ML
INJECTION, SOLUTION INTRAVENOUS CONTINUOUS PRN
Status: DISCONTINUED | OUTPATIENT
Start: 2022-08-19 | End: 2022-08-19

## 2022-08-19 RX ORDER — LIDOCAINE HYDROCHLORIDE 20 MG/ML
15 SOLUTION OROPHARYNGEAL ONCE
Status: COMPLETED | OUTPATIENT
Start: 2022-08-19 | End: 2022-08-19

## 2022-08-19 RX ORDER — ONDANSETRON 2 MG/ML
4 INJECTION INTRAMUSCULAR; INTRAVENOUS 2 TIMES DAILY PRN
Status: DISCONTINUED | OUTPATIENT
Start: 2022-08-19 | End: 2022-08-19 | Stop reason: HOSPADM

## 2022-08-19 RX ADMIN — Medication 100 MG: at 12:04

## 2022-08-19 RX ADMIN — SODIUM CHLORIDE: 900 INJECTION INTRAVENOUS at 09:58

## 2022-08-19 RX ADMIN — ANASTROZOLE 1 MG: 1 TABLET ORAL at 12:05

## 2022-08-19 RX ADMIN — PROPOFOL 10 MG: 10 INJECTION, EMULSION INTRAVENOUS at 10:00

## 2022-08-19 RX ADMIN — LIDOCAINE HYDROCHLORIDE 3 ML: 20 INJECTION, SOLUTION INFILTRATION; PERINEURAL at 09:59

## 2022-08-19 RX ADMIN — SODIUM CHLORIDE, PRESERVATIVE FREE 2 ML: 5 INJECTION INTRAVENOUS at 12:10

## 2022-08-19 RX ADMIN — OXYCODONE HYDROCHLORIDE AND ACETAMINOPHEN 500 MG: 500 TABLET ORAL at 12:05

## 2022-08-19 RX ADMIN — PROPOFOL 5 MG: 10 INJECTION, EMULSION INTRAVENOUS at 10:03

## 2022-08-19 RX ADMIN — PROPOFOL 20 MG: 10 INJECTION, EMULSION INTRAVENOUS at 09:59

## 2022-08-19 RX ADMIN — DILTIAZEM HYDROCHLORIDE 180 MG: 180 CAPSULE, EXTENDED RELEASE ORAL at 12:04

## 2022-08-19 RX ADMIN — LIDOCAINE HYDROCHLORIDE 15 ML: 20 SOLUTION ORAL; TOPICAL at 09:52

## 2022-08-19 RX ADMIN — Medication 2000 UNITS: at 12:05

## 2022-08-19 SDOH — SOCIAL STABILITY: SOCIAL INSECURITY: RISK FACTORS: HEART DISEASE

## 2022-08-19 ASSESSMENT — PAIN SCALES - GENERAL
PAINLEVEL_OUTOF10: 0

## 2022-08-19 ASSESSMENT — ENCOUNTER SYMPTOMS: EXERCISE TOLERANCE: GOOD (>4 METS)

## 2022-09-02 ENCOUNTER — APPOINTMENT (OUTPATIENT)
Dept: CARDIOLOGY | Age: 82
End: 2022-09-02

## 2022-09-15 ENCOUNTER — HOSPITAL ENCOUNTER (OUTPATIENT)
Dept: GENERAL RADIOLOGY | Facility: HOSPITAL | Age: 82
Discharge: HOME | End: 2022-09-15
Attending: ORTHOPAEDIC SURGERY
Payer: MEDICARE

## 2022-09-15 DIAGNOSIS — Z96.611 H/O TOTAL SHOULDER REPLACEMENT, RIGHT: ICD-10-CM

## 2022-09-15 DIAGNOSIS — M25.512 LEFT SHOULDER PAIN, UNSPECIFIED CHRONICITY: ICD-10-CM

## 2022-09-15 PROCEDURE — 73030 X-RAY EXAM OF SHOULDER: CPT | Performed by: ORTHOPAEDIC SURGERY

## 2022-11-16 ENCOUNTER — OFFICE VISIT (OUTPATIENT)
Dept: URGENT CARE | Age: 82
End: 2022-11-16
Attending: EMERGENCY MEDICINE

## 2022-11-16 VITALS
BODY MASS INDEX: 19.85 KG/M2 | RESPIRATION RATE: 16 BRPM | SYSTOLIC BLOOD PRESSURE: 122 MMHG | HEART RATE: 60 BPM | DIASTOLIC BLOOD PRESSURE: 73 MMHG | TEMPERATURE: 97.4 F | WEIGHT: 123 LBS | OXYGEN SATURATION: 100 %

## 2022-11-16 DIAGNOSIS — Z01.10 NORMAL EAR EXAM: Primary | ICD-10-CM

## 2022-11-16 DIAGNOSIS — Z00.00 NORMAL PHYSICAL EXAM: ICD-10-CM

## 2022-11-16 PROCEDURE — 99212 OFFICE O/P EST SF 10 MIN: CPT

## 2022-11-16 ASSESSMENT — ENCOUNTER SYMPTOMS
HEMATOLOGIC/LYMPHATIC NEGATIVE: 1
EYES NEGATIVE: 1
NEUROLOGICAL NEGATIVE: 1
ENDOCRINE NEGATIVE: 1
GASTROINTESTINAL NEGATIVE: 1
ALLERGIC/IMMUNOLOGIC NEGATIVE: 1
RESPIRATORY NEGATIVE: 1
PSYCHIATRIC NEGATIVE: 1
CONSTITUTIONAL NEGATIVE: 1

## 2022-11-16 ASSESSMENT — PAIN SCALES - GENERAL: PAINLEVEL: 0

## 2023-01-01 ENCOUNTER — EXTERNAL RECORD (OUTPATIENT)
Dept: RADIATION ONCOLOGY | Age: 83
End: 2023-01-01

## 2023-03-15 ENCOUNTER — IMAGING SERVICES (OUTPATIENT)
Dept: OTHER | Age: 83
End: 2023-03-15

## 2023-03-24 ENCOUNTER — IMAGING SERVICES (OUTPATIENT)
Dept: OTHER | Age: 83
End: 2023-03-24

## 2023-04-05 ENCOUNTER — EXTERNAL LAB (OUTPATIENT)
Dept: OTHER | Age: 83
End: 2023-04-05

## 2023-04-05 LAB — LAB RESULT: NORMAL

## 2023-04-12 ENCOUNTER — IMAGING SERVICES (OUTPATIENT)
Dept: OTHER | Age: 83
End: 2023-04-12

## 2023-04-18 ENCOUNTER — IMAGING SERVICES (OUTPATIENT)
Dept: OTHER | Age: 83
End: 2023-04-18

## 2023-04-25 ENCOUNTER — IMAGING SERVICES (OUTPATIENT)
Dept: OTHER | Age: 83
End: 2023-04-25

## 2023-05-04 ENCOUNTER — HOSPITAL ENCOUNTER (EMERGENCY)
Facility: HOSPITAL | Age: 83
Discharge: HOME | End: 2023-05-04
Attending: EMERGENCY MEDICINE
Payer: MEDICARE

## 2023-05-04 ENCOUNTER — APPOINTMENT (OUTPATIENT)
Dept: GENERAL RADIOLOGY | Facility: HOSPITAL | Age: 83
End: 2023-05-04
Payer: MEDICARE

## 2023-05-04 VITALS
RESPIRATION RATE: 18 BRPM | SYSTOLIC BLOOD PRESSURE: 128 MMHG | HEART RATE: 64 BPM | TEMPERATURE: 98 F | OXYGEN SATURATION: 98 % | DIASTOLIC BLOOD PRESSURE: 62 MMHG
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
## 2023-05-04 DIAGNOSIS — S89.91XA INJURY OF RIGHT KNEE, INITIAL ENCOUNTER: Primary | ICD-10-CM

## 2023-05-04 PROCEDURE — 99284 EMERGENCY DEPT VISIT MOD MDM: CPT

## 2023-05-04 PROCEDURE — 73562 X-RAY EXAM OF KNEE 3: CPT | Performed by: EMERGENCY MEDICINE

## 2023-05-04 RX ORDER — TRAMADOL HYDROCHLORIDE 50 MG/1
50 TABLET ORAL ONCE
Status: DISCONTINUED | OUTPATIENT
Start: 2023-05-04 | End: 2023-05-04

## 2023-05-04 RX ORDER — TRAMADOL HYDROCHLORIDE 50 MG/1
50 TABLET ORAL EVERY 8 HOURS PRN
Qty: 10 TABLET | Refills: 0 | Status: SHIPPED | OUTPATIENT
Start: 2023-05-04 | End: 2023-05-07

## 2023-05-04 NOTE — ED QUICK NOTES
Patient ordered tramadol for pain and was agreeing for med. When placed in med cup, patient stated \"actually I do not want that\". Charted in [facility]. Tramadol wasted via pyxis.

## 2023-05-04 NOTE — ED INITIAL ASSESSMENT (HPI)
Patient arrives through triage in wheelchair with c/o of a fall. Patient states she tripped on the sidewalk, fell forward on her hands and r. Knee. Patient states her r. Knee hurts. Patient states she did hit her head, did not lose consciousness, denies nausea/vomiting. Patient states is on blood thinners.

## 2023-07-26 ENCOUNTER — EXTERNAL RECORD (OUTPATIENT)
Dept: HEALTH INFORMATION MANAGEMENT | Facility: OTHER | Age: 83
End: 2023-07-26

## 2023-08-04 ENCOUNTER — CLINICAL ABSTRACT (OUTPATIENT)
Dept: HEALTH INFORMATION MANAGEMENT | Facility: OTHER | Age: 83
End: 2023-08-04

## 2023-08-08 ENCOUNTER — HOSPITAL ENCOUNTER (OUTPATIENT)
Dept: RADIATION ONCOLOGY | Age: 83
Discharge: HOME | End: 2023-08-08
Attending: RADIOLOGY

## 2023-08-08 VITALS
TEMPERATURE: 97.5 F | OXYGEN SATURATION: 99 % | HEART RATE: 62 BPM | DIASTOLIC BLOOD PRESSURE: 64 MMHG | SYSTOLIC BLOOD PRESSURE: 107 MMHG

## 2023-08-08 PROCEDURE — 99205 OFFICE O/P NEW HI 60 MIN: CPT | Performed by: RADIOLOGY

## 2023-08-08 PROCEDURE — 99213 OFFICE O/P EST LOW 20 MIN: CPT

## 2023-08-08 RX ORDER — WARFARIN SODIUM 5 MG/1
TABLET ORAL
COMMUNITY
Start: 2023-05-09

## 2023-08-08 ASSESSMENT — ENCOUNTER SYMPTOMS
EYE PROBLEMS: 0
RESPIRATORY NEGATIVE: 1
ABDOMINAL DISTENTION: 0
NERVOUS/ANXIOUS: 0
BLOOD IN STOOL: 0
DIARRHEA: 0
CONSTITUTIONAL NEGATIVE: 1
BACK PAIN: 1
VOMITING: 0
ABDOMINAL PAIN: 1
DEPRESSION: 0
LIGHT-HEADEDNESS: 1
VOICE CHANGE: 0
NUMBNESS: 1
BRUISES/BLEEDS EASILY: 1
DIZZINESS: 1
HOT FLASHES: 1
NAUSEA: 0
TROUBLE SWALLOWING: 0
CONSTIPATION: 0
SORE THROAT: 0
RECTAL PAIN: 0

## 2023-08-08 ASSESSMENT — PAIN SCALES - GENERAL
PAINLEVEL_OUTOF10: 0
PAINLEVEL: 0

## 2023-08-10 ENCOUNTER — CLINICAL ABSTRACT (OUTPATIENT)
Dept: HEALTH INFORMATION MANAGEMENT | Facility: OTHER | Age: 83
End: 2023-08-10

## 2023-08-10 ASSESSMENT — ENCOUNTER SYMPTOMS
BLOOD IN STOOL: 0
DIZZINESS: 1
NAUSEA: 0
RESPIRATORY NEGATIVE: 1
ABDOMINAL DISTENTION: 0
TROUBLE SWALLOWING: 0
ABDOMINAL PAIN: 1
LIGHT-HEADEDNESS: 1
NUMBNESS: 1
SORE THROAT: 0
CONSTITUTIONAL NEGATIVE: 1
EYE PROBLEMS: 0
BRUISES/BLEEDS EASILY: 1
DEPRESSION: 0
BACK PAIN: 1
RECTAL PAIN: 0
VOICE CHANGE: 0
CONSTIPATION: 0
HOT FLASHES: 1
DIARRHEA: 0
NERVOUS/ANXIOUS: 0
VOMITING: 0

## 2023-08-14 ENCOUNTER — CANCER NAVIGATOR (OUTPATIENT)
Dept: ONCOLOGY | Age: 83
End: 2023-08-14

## 2023-08-15 ENCOUNTER — HOSPITAL ENCOUNTER (OUTPATIENT)
Dept: RADIATION ONCOLOGY | Age: 83
Discharge: STILL ADMITTED OR ADMINISTRATIVE | End: 2023-08-15
Attending: RADIOLOGY

## 2023-08-15 DIAGNOSIS — C50.912 LEFT BREAST CANCER WITH T3 TUMOR, >5 CM IN GREATEST DIMENSION (CMD): ICD-10-CM

## 2023-08-15 DIAGNOSIS — C50.912 LEFT BREAST CANCER WITH T3 TUMOR, >5 CM IN GREATEST DIMENSION (CMD): Primary | ICD-10-CM

## 2023-08-15 PROCEDURE — 77290 THER RAD SIMULAJ FIELD CPLX: CPT | Performed by: RADIOLOGY

## 2023-08-15 PROCEDURE — 77334 RADIATION TREATMENT AID(S): CPT | Performed by: RADIOLOGY

## 2023-08-15 PROCEDURE — 77332 RADIATION TREATMENT AID(S): CPT | Performed by: RADIOLOGY

## 2023-08-15 PROCEDURE — 77263 THER RADIOLOGY TX PLNG CPLX: CPT | Performed by: RADIOLOGY

## 2023-08-17 ENCOUNTER — TELEPHONE (OUTPATIENT)
Dept: RADIATION ONCOLOGY | Age: 83
End: 2023-08-17

## 2023-08-21 ENCOUNTER — TELEPHONE (OUTPATIENT)
Dept: RADIATION ONCOLOGY | Age: 83
End: 2023-08-21

## 2023-08-25 ENCOUNTER — APPOINTMENT (OUTPATIENT)
Dept: RADIATION ONCOLOGY | Age: 83
End: 2023-08-25
Attending: RADIOLOGY

## 2023-08-28 ENCOUNTER — APPOINTMENT (OUTPATIENT)
Dept: RADIATION ONCOLOGY | Age: 83
End: 2023-08-28
Attending: RADIOLOGY

## 2023-08-29 ENCOUNTER — APPOINTMENT (OUTPATIENT)
Dept: RADIATION ONCOLOGY | Age: 83
End: 2023-08-29
Attending: RADIOLOGY

## 2023-08-30 ENCOUNTER — APPOINTMENT (OUTPATIENT)
Dept: RADIATION ONCOLOGY | Age: 83
End: 2023-08-30
Attending: RADIOLOGY

## 2023-08-31 ENCOUNTER — APPOINTMENT (OUTPATIENT)
Dept: RADIATION ONCOLOGY | Age: 83
End: 2023-08-31
Attending: RADIOLOGY

## 2023-09-01 ENCOUNTER — HOSPITAL ENCOUNTER (INPATIENT)
Age: 83
LOS: 1 days | Discharge: HOME | DRG: 310 | End: 2023-09-03
Attending: EMERGENCY MEDICINE | Admitting: INTERNAL MEDICINE

## 2023-09-01 ENCOUNTER — APPOINTMENT (OUTPATIENT)
Dept: RADIATION ONCOLOGY | Age: 83
End: 2023-09-01
Attending: RADIOLOGY

## 2023-09-01 ENCOUNTER — APPOINTMENT (OUTPATIENT)
Dept: GENERAL RADIOLOGY | Age: 83
DRG: 310 | End: 2023-09-01
Attending: EMERGENCY MEDICINE

## 2023-09-01 DIAGNOSIS — I48.91 ATRIAL FIBRILLATION WITH RVR (CMD): Primary | ICD-10-CM

## 2023-09-01 LAB
ALBUMIN SERPL-MCNC: 3.6 G/DL (ref 3.6–5.1)
ALBUMIN/GLOB SERPL: 1.1 {RATIO} (ref 1–2.4)
ALP SERPL-CCNC: 66 UNITS/L (ref 45–117)
ALT SERPL-CCNC: 29 UNITS/L
ANION GAP SERPL CALC-SCNC: 15 MMOL/L (ref 7–19)
AST SERPL-CCNC: 26 UNITS/L
BASOPHILS # BLD: 0 K/MCL (ref 0–0.3)
BASOPHILS NFR BLD: 1 %
BILIRUB SERPL-MCNC: 0.3 MG/DL (ref 0.2–1)
BUN SERPL-MCNC: 26 MG/DL (ref 6–20)
BUN/CREAT SERPL: 37 (ref 7–25)
CALCIUM SERPL-MCNC: 9 MG/DL (ref 8.4–10.2)
CHLORIDE SERPL-SCNC: 104 MMOL/L (ref 97–110)
CO2 SERPL-SCNC: 25 MMOL/L (ref 21–32)
CREAT SERPL-MCNC: 0.71 MG/DL (ref 0.51–0.95)
DEPRECATED RDW RBC: 43 FL (ref 39–50)
EGFRCR SERPLBLD CKD-EPI 2021: 85 ML/MIN/{1.73_M2}
EOSINOPHIL # BLD: 0.2 K/MCL (ref 0–0.5)
EOSINOPHIL NFR BLD: 3 %
ERYTHROCYTE [DISTWIDTH] IN BLOOD: 12.3 % (ref 11–15)
FASTING DURATION TIME PATIENT: ABNORMAL H
GLOBULIN SER-MCNC: 3.2 G/DL (ref 2–4)
GLUCOSE SERPL-MCNC: 96 MG/DL (ref 70–99)
HCT VFR BLD CALC: 42 % (ref 36–46.5)
HGB BLD-MCNC: 14.3 G/DL (ref 12–15.5)
IMM GRANULOCYTES # BLD AUTO: 0 K/MCL (ref 0–0.2)
IMM GRANULOCYTES # BLD: 0 %
INR PPP: 1.2
LYMPHOCYTES # BLD: 2.4 K/MCL (ref 1–4)
LYMPHOCYTES NFR BLD: 42 %
MAGNESIUM SERPL-MCNC: 1.8 MG/DL (ref 1.7–2.4)
MCH RBC QN AUTO: 32.2 PG (ref 26–34)
MCHC RBC AUTO-ENTMCNC: 34 G/DL (ref 32–36.5)
MCV RBC AUTO: 94.6 FL (ref 78–100)
MONOCYTES # BLD: 0.5 K/MCL (ref 0.3–0.9)
MONOCYTES NFR BLD: 9 %
NEUTROPHILS # BLD: 2.7 K/MCL (ref 1.8–7.7)
NEUTROPHILS NFR BLD: 45 %
NRBC BLD MANUAL-RTO: 0 /100 WBC
PLATELET # BLD AUTO: 161 K/MCL (ref 140–450)
POTASSIUM SERPL-SCNC: 3.5 MMOL/L (ref 3.4–5.1)
PROT SERPL-MCNC: 6.8 G/DL (ref 6.4–8.2)
PROTHROMBIN TIME: 12.3 SEC (ref 9.7–11.8)
RBC # BLD: 4.44 MIL/MCL (ref 4–5.2)
SODIUM SERPL-SCNC: 140 MMOL/L (ref 135–145)
TROPONIN I SERPL DL<=0.01 NG/ML-MCNC: 21 NG/L
WBC # BLD: 5.8 K/MCL (ref 4.2–11)

## 2023-09-01 PROCEDURE — 10002807 HB RX 258: Performed by: EMERGENCY MEDICINE

## 2023-09-01 PROCEDURE — 96374 THER/PROPH/DIAG INJ IV PUSH: CPT

## 2023-09-01 PROCEDURE — 85610 PROTHROMBIN TIME: CPT | Performed by: EMERGENCY MEDICINE

## 2023-09-01 PROCEDURE — 93005 ELECTROCARDIOGRAM TRACING: CPT | Performed by: EMERGENCY MEDICINE

## 2023-09-01 PROCEDURE — 80053 COMPREHEN METABOLIC PANEL: CPT | Performed by: EMERGENCY MEDICINE

## 2023-09-01 PROCEDURE — 93010 ELECTROCARDIOGRAM REPORT: CPT | Performed by: INTERNAL MEDICINE

## 2023-09-01 PROCEDURE — 99291 CRITICAL CARE FIRST HOUR: CPT

## 2023-09-01 PROCEDURE — 71045 X-RAY EXAM CHEST 1 VIEW: CPT

## 2023-09-01 PROCEDURE — 83735 ASSAY OF MAGNESIUM: CPT | Performed by: EMERGENCY MEDICINE

## 2023-09-01 PROCEDURE — 84484 ASSAY OF TROPONIN QUANT: CPT | Performed by: EMERGENCY MEDICINE

## 2023-09-01 PROCEDURE — 85025 COMPLETE CBC W/AUTO DIFF WBC: CPT | Performed by: EMERGENCY MEDICINE

## 2023-09-01 PROCEDURE — 85730 THROMBOPLASTIN TIME PARTIAL: CPT | Performed by: EMERGENCY MEDICINE

## 2023-09-01 PROCEDURE — 10002801 HB RX 250 W/O HCPCS: Performed by: EMERGENCY MEDICINE

## 2023-09-01 RX ORDER — HEPARIN SODIUM 10000 [USP'U]/100ML
1-30 INJECTION, SOLUTION INTRAVENOUS CONTINUOUS
Status: DISCONTINUED | OUTPATIENT
Start: 2023-09-02 | End: 2023-09-03 | Stop reason: HOSPADM

## 2023-09-01 RX ORDER — HEPARIN SODIUM 1000 [USP'U]/ML
40 INJECTION, SOLUTION INTRAVENOUS; SUBCUTANEOUS ONCE
Status: COMPLETED | OUTPATIENT
Start: 2023-09-02 | End: 2023-09-02

## 2023-09-01 RX ORDER — HEPARIN SODIUM 1000 [USP'U]/ML
40 INJECTION, SOLUTION INTRAVENOUS; SUBCUTANEOUS PRN
Status: DISCONTINUED | OUTPATIENT
Start: 2023-09-01 | End: 2023-09-03 | Stop reason: HOSPADM

## 2023-09-01 RX ORDER — HEPARIN SODIUM 1000 [USP'U]/ML
30 INJECTION, SOLUTION INTRAVENOUS; SUBCUTANEOUS PRN
Status: DISCONTINUED | OUTPATIENT
Start: 2023-09-01 | End: 2023-09-03 | Stop reason: HOSPADM

## 2023-09-01 RX ADMIN — SODIUM CHLORIDE 1000 ML: 9 INJECTION, SOLUTION INTRAVENOUS at 23:53

## 2023-09-01 RX ADMIN — Medication 5 MG/HR: at 23:52

## 2023-09-01 ASSESSMENT — PAIN SCALES - GENERAL: PAINLEVEL_OUTOF10: 6

## 2023-09-02 ENCOUNTER — APPOINTMENT (OUTPATIENT)
Dept: CARDIOLOGY | Age: 83
DRG: 310 | End: 2023-09-02
Attending: INTERNAL MEDICINE

## 2023-09-02 PROBLEM — I48.91 ATRIAL FIBRILLATION WITH RVR (CMD): Status: ACTIVE | Noted: 2023-09-02

## 2023-09-02 LAB
ALBUMIN SERPL-MCNC: 3.3 G/DL (ref 3.6–5.1)
ALBUMIN/GLOB SERPL: 1.1 {RATIO} (ref 1–2.4)
ALP SERPL-CCNC: 57 UNITS/L (ref 45–117)
ALT SERPL-CCNC: 27 UNITS/L
ANION GAP SERPL CALC-SCNC: 15 MMOL/L (ref 7–19)
AORTIC VALVE AREA (AVA): 1.02
APPEARANCE UR: CLEAR
APTT PPP: 26 SEC (ref 22–30)
APTT PPP: 45 SEC (ref 22–30)
APTT PPP: 52 SEC (ref 22–30)
ASCENDING AORTA (AAD): 8
AST SERPL-CCNC: 22 UNITS/L
AV MEAN GRADIENT (AVMG): 3
AV MEAN VELOCITY (AVMV): 0.76
AV PEAK GRADIENT (AVPG): 5
AV PEAK VELOCITY (AVPV): 1.12
AV STENOSIS SEVERITY TEXT: NORMAL
AVI LVOT PEAK GRADIENT (LVOTMG): 0.8
BACTERIA #/AREA URNS HPF: ABNORMAL /HPF
BASOPHILS # BLD: 0.1 K/MCL (ref 0–0.3)
BASOPHILS NFR BLD: 1 %
BILIRUB SERPL-MCNC: 0.4 MG/DL (ref 0.2–1)
BILIRUB UR QL STRIP: NEGATIVE
BUN SERPL-MCNC: 16 MG/DL (ref 6–20)
BUN/CREAT SERPL: 26 (ref 7–25)
CALCIUM SERPL-MCNC: 8.5 MG/DL (ref 8.4–10.2)
CHLORIDE SERPL-SCNC: 108 MMOL/L (ref 97–110)
CO2 SERPL-SCNC: 23 MMOL/L (ref 21–32)
COLOR UR: ABNORMAL
CREAT SERPL-MCNC: 0.61 MG/DL (ref 0.51–0.95)
DEPRECATED RDW RBC: 43 FL (ref 39–50)
E WAVE DECELARATION TIME (MDT): 10
EGFRCR SERPLBLD CKD-EPI 2021: 90 ML/MIN/{1.73_M2}
EOSINOPHIL # BLD: 0.2 K/MCL (ref 0–0.5)
EOSINOPHIL NFR BLD: 4 %
ERYTHROCYTE [DISTWIDTH] IN BLOOD: 12.4 % (ref 11–15)
FASTING DURATION TIME PATIENT: ABNORMAL H
GLOBULIN SER-MCNC: 2.9 G/DL (ref 2–4)
GLUCOSE SERPL-MCNC: 84 MG/DL (ref 70–99)
GLUCOSE UR STRIP-MCNC: NEGATIVE MG/DL
HCT VFR BLD CALC: 40 % (ref 36–46.5)
HGB BLD-MCNC: 13.5 G/DL (ref 12–15.5)
HGB UR QL STRIP: NEGATIVE
HYALINE CASTS #/AREA URNS LPF: ABNORMAL /LPF
IMM GRANULOCYTES # BLD AUTO: 0 K/MCL (ref 0–0.2)
IMM GRANULOCYTES # BLD: 0 %
INR PPP: 1.4
INTERVENTRICULAR SEPTUM IN END DIASTOLE (IVSD): 1.89
KETONES UR STRIP-MCNC: NEGATIVE MG/DL
LEFT INTERNAL DIMENSION IN SYSTOLE (LVSD): 0.7
LEFT VENTRICULAR INTERNAL DIMENSION IN DIASTOLE (LVDD): 2.5
LEFT VENTRICULAR POSTERIOR WALL IN END DIASTOLE (LVPW): 3.6
LEUKOCYTE ESTERASE UR QL STRIP: ABNORMAL
LV EF: NORMAL %
LVOT 2D (LVOTD): 13.4
LVOT VTI (LVOTVTI): 0.82
LYMPHOCYTES # BLD: 2.6 K/MCL (ref 1–4)
LYMPHOCYTES NFR BLD: 45 %
MCH RBC QN AUTO: 32.1 PG (ref 26–34)
MCHC RBC AUTO-ENTMCNC: 33.8 G/DL (ref 32–36.5)
MCV RBC AUTO: 95 FL (ref 78–100)
MONOCYTES # BLD: 0.6 K/MCL (ref 0.3–0.9)
MONOCYTES NFR BLD: 10 %
MV E TISSUE VEL LAT (MELV): 0.57
MV E TISSUE VEL MED (MESV): 13.2
MV E WAVE VEL/E TISSUE VEL MED(MSR): 10.2
MV PEAK A VELOCITY (MVPAV): 158
MV PEAK E VELOCITY (MVPEV): 0.3
NEUTROPHILS # BLD: 2.3 K/MCL (ref 1.8–7.7)
NEUTROPHILS NFR BLD: 40 %
NITRITE UR QL STRIP: NEGATIVE
NRBC BLD MANUAL-RTO: 0 /100 WBC
PH UR STRIP: 7 [PH] (ref 5–7)
PLATELET # BLD AUTO: 153 K/MCL (ref 140–450)
POTASSIUM SERPL-SCNC: 4 MMOL/L (ref 3.4–5.1)
PROT SERPL-MCNC: 6.2 G/DL (ref 6.4–8.2)
PROT UR STRIP-MCNC: NEGATIVE MG/DL
PROTHROMBIN TIME: 13.3 SEC (ref 9.7–11.8)
RAINBOW EXTRA TUBES HOLD SPECIMEN: NORMAL
RBC # BLD: 4.21 MIL/MCL (ref 4–5.2)
RBC #/AREA URNS HPF: ABNORMAL /HPF
SODIUM SERPL-SCNC: 142 MMOL/L (ref 135–145)
SP GR UR STRIP: <1.005 (ref 1–1.03)
SQUAMOUS #/AREA URNS HPF: ABNORMAL /HPF
TRICUSPID VALVE ANNULAR PEAK VELOCITY (TVAPV): 23
TSH SERPL-ACNC: 2.54 MCUNITS/ML (ref 0.35–5)
UROBILINOGEN UR STRIP-MCNC: 0.2 MG/DL
WBC # BLD: 5.7 K/MCL (ref 4.2–11)
WBC #/AREA URNS HPF: ABNORMAL /HPF

## 2023-09-02 PROCEDURE — 10002803 HB RX 637: Performed by: INTERNAL MEDICINE

## 2023-09-02 PROCEDURE — 84443 ASSAY THYROID STIM HORMONE: CPT | Performed by: EMERGENCY MEDICINE

## 2023-09-02 PROCEDURE — 10002800 HB RX 250 W HCPCS: Performed by: INTERNAL MEDICINE

## 2023-09-02 PROCEDURE — 93010 ELECTROCARDIOGRAM REPORT: CPT | Performed by: INTERNAL MEDICINE

## 2023-09-02 PROCEDURE — 87086 URINE CULTURE/COLONY COUNT: CPT | Performed by: EMERGENCY MEDICINE

## 2023-09-02 PROCEDURE — 85610 PROTHROMBIN TIME: CPT | Performed by: INTERNAL MEDICINE

## 2023-09-02 PROCEDURE — 10002800 HB RX 250 W HCPCS: Performed by: EMERGENCY MEDICINE

## 2023-09-02 PROCEDURE — 99497 ADVNCD CARE PLAN 30 MIN: CPT | Performed by: INTERNAL MEDICINE

## 2023-09-02 PROCEDURE — 99223 1ST HOSP IP/OBS HIGH 75: CPT | Performed by: INTERNAL MEDICINE

## 2023-09-02 PROCEDURE — 85730 THROMBOPLASTIN TIME PARTIAL: CPT | Performed by: INTERNAL MEDICINE

## 2023-09-02 PROCEDURE — 93005 ELECTROCARDIOGRAM TRACING: CPT | Performed by: INTERNAL MEDICINE

## 2023-09-02 PROCEDURE — 10006031 HB ROOM CHARGE TELEMETRY

## 2023-09-02 PROCEDURE — 93306 TTE W/DOPPLER COMPLETE: CPT | Performed by: INTERNAL MEDICINE

## 2023-09-02 PROCEDURE — 36415 COLL VENOUS BLD VENIPUNCTURE: CPT | Performed by: INTERNAL MEDICINE

## 2023-09-02 PROCEDURE — 10004651 HB RX, NO CHARGE ITEM: Performed by: INTERNAL MEDICINE

## 2023-09-02 PROCEDURE — 85025 COMPLETE CBC W/AUTO DIFF WBC: CPT | Performed by: INTERNAL MEDICINE

## 2023-09-02 PROCEDURE — 93306 TTE W/DOPPLER COMPLETE: CPT

## 2023-09-02 PROCEDURE — 80053 COMPREHEN METABOLIC PANEL: CPT | Performed by: INTERNAL MEDICINE

## 2023-09-02 PROCEDURE — 81001 URINALYSIS AUTO W/SCOPE: CPT | Performed by: EMERGENCY MEDICINE

## 2023-09-02 RX ORDER — POTASSIUM CHLORIDE 20 MEQ/1
40 TABLET, EXTENDED RELEASE ORAL ONCE
Status: COMPLETED | OUTPATIENT
Start: 2023-09-02 | End: 2023-09-02

## 2023-09-02 RX ORDER — ONDANSETRON 2 MG/ML
4 INJECTION INTRAMUSCULAR; INTRAVENOUS 2 TIMES DAILY PRN
Status: DISCONTINUED | OUTPATIENT
Start: 2023-09-02 | End: 2023-09-03 | Stop reason: HOSPADM

## 2023-09-02 RX ORDER — CEFAZOLIN SODIUM/WATER 1 G/10 ML
1000 SYRINGE (ML) INTRAVENOUS DAILY
Status: DISCONTINUED | OUTPATIENT
Start: 2023-09-02 | End: 2023-09-03 | Stop reason: HOSPADM

## 2023-09-02 RX ORDER — DILTIAZEM HYDROCHLORIDE 120 MG/1
120 CAPSULE, EXTENDED RELEASE ORAL DAILY
Status: DISCONTINUED | OUTPATIENT
Start: 2023-09-02 | End: 2023-09-03 | Stop reason: HOSPADM

## 2023-09-02 RX ORDER — ANASTROZOLE 1 MG/1
1 TABLET ORAL DAILY
Status: DISCONTINUED | OUTPATIENT
Start: 2023-09-02 | End: 2023-09-02

## 2023-09-02 RX ORDER — 0.9 % SODIUM CHLORIDE 0.9 %
2 VIAL (ML) INJECTION EVERY 12 HOURS SCHEDULED
Status: DISCONTINUED | OUTPATIENT
Start: 2023-09-02 | End: 2023-09-03 | Stop reason: HOSPADM

## 2023-09-02 RX ORDER — AMOXICILLIN 250 MG
2 CAPSULE ORAL DAILY PRN
Status: DISCONTINUED | OUTPATIENT
Start: 2023-09-02 | End: 2023-09-03 | Stop reason: HOSPADM

## 2023-09-02 RX ORDER — WARFARIN SODIUM 5 MG/1
5 TABLET ORAL ONCE
Status: COMPLETED | OUTPATIENT
Start: 2023-09-02 | End: 2023-09-02

## 2023-09-02 RX ORDER — ACETAMINOPHEN 325 MG/1
650 TABLET ORAL EVERY 4 HOURS PRN
Status: DISCONTINUED | OUTPATIENT
Start: 2023-09-02 | End: 2023-09-03 | Stop reason: HOSPADM

## 2023-09-02 RX ORDER — POLYETHYLENE GLYCOL 3350 17 G/17G
17 POWDER, FOR SOLUTION ORAL DAILY PRN
Status: DISCONTINUED | OUTPATIENT
Start: 2023-09-02 | End: 2023-09-03 | Stop reason: HOSPADM

## 2023-09-02 RX ADMIN — POTASSIUM CHLORIDE 40 MEQ: 1500 TABLET, EXTENDED RELEASE ORAL at 04:43

## 2023-09-02 RX ADMIN — SODIUM CHLORIDE, PRESERVATIVE FREE 2 ML: 5 INJECTION INTRAVENOUS at 08:23

## 2023-09-02 RX ADMIN — SODIUM CHLORIDE, PRESERVATIVE FREE 2 ML: 5 INJECTION INTRAVENOUS at 20:44

## 2023-09-02 RX ADMIN — WARFARIN SODIUM 5 MG: 5 TABLET ORAL at 19:18

## 2023-09-02 RX ADMIN — HEPARIN SODIUM 12 UNITS/KG/HR: 10000 INJECTION, SOLUTION INTRAVENOUS at 00:55

## 2023-09-02 RX ADMIN — HEPARIN SODIUM 2200 UNITS: 1000 INJECTION, SOLUTION INTRAVENOUS; SUBCUTANEOUS at 01:00

## 2023-09-02 RX ADMIN — DILTIAZEM HYDROCHLORIDE 120 MG: 120 CAPSULE, EXTENDED RELEASE ORAL at 10:46

## 2023-09-02 RX ADMIN — CEFTRIAXONE SODIUM 1000 MG: 10 INJECTION, POWDER, FOR SOLUTION INTRAVENOUS at 08:23

## 2023-09-02 SDOH — ECONOMIC STABILITY: HOUSING INSECURITY: WHAT IS YOUR LIVING SITUATION TODAY?: HOUSE

## 2023-09-02 SDOH — HEALTH STABILITY: GENERAL
BECAUSE OF A PHYSICAL, MENTAL, OR EMOTIONAL CONDITION, DO YOU HAVE SERIOUS DIFFICULTY CONCENTRATING, REMEMBERING OR MAKING DECISIONS?: NO

## 2023-09-02 SDOH — ECONOMIC STABILITY: HOUSING INSECURITY: ARE YOU WORRIED ABOUT LOSING YOUR HOUSING?: NO

## 2023-09-02 SDOH — ECONOMIC STABILITY: FOOD INSECURITY: HOW OFTEN IN THE PAST 12 MONTHS WERE YOU WORRIED OR STRESSED ABOUT HAVING ENOUGH MONEY TO BUY NUTRITIOUS MEALS?: NEVER

## 2023-09-02 SDOH — ECONOMIC STABILITY: TRANSPORTATION INSECURITY
IN THE PAST 12 MONTHS, HAS LACK OF TRANSPORTATION KEPT YOU FROM MEETINGS, WORK, OR FROM GETTING THINGS NEEDED FOR DAILY LIVING?: NO

## 2023-09-02 SDOH — ECONOMIC STABILITY: TRANSPORTATION INSECURITY
IN THE PAST 12 MONTHS, HAS THE LACK OF TRANSPORTATION KEPT YOU FROM MEDICAL APPOINTMENTS OR FROM GETTING MEDICATIONS?: NO

## 2023-09-02 SDOH — ECONOMIC STABILITY: GENERAL

## 2023-09-02 SDOH — SOCIAL STABILITY: SOCIAL NETWORK
HOW OFTEN DO YOU SEE OR TALK TO PEOPLE THAT YOU CARE ABOUT AND FEEL CLOSE TO? (FOR EXAMPLE: TALKING TO FRIENDS ON THE PHONE, VISITING FRIENDS OR FAMILY, GOING TO CHURCH OR CLUB MEETINGS): 5 OR MORE TIMES A WEEK

## 2023-09-02 SDOH — HEALTH STABILITY: PHYSICAL HEALTH: DO YOU HAVE SERIOUS DIFFICULTY WALKING OR CLIMBING STAIRS?: NO

## 2023-09-02 SDOH — HEALTH STABILITY: PHYSICAL HEALTH: DO YOU HAVE DIFFICULTY DRESSING OR BATHING?: NO

## 2023-09-02 SDOH — SOCIAL STABILITY: SOCIAL NETWORK: SUPPORT SYSTEMS: FAMILY MEMBERS

## 2023-09-02 SDOH — ECONOMIC STABILITY: HOUSING INSECURITY: WHAT IS YOUR LIVING SITUATION TODAY?: SPOUSE

## 2023-09-02 SDOH — HEALTH STABILITY: GENERAL: BECAUSE OF A PHYSICAL, MENTAL, OR EMOTIONAL CONDITION, DO YOU HAVE DIFFICULTY DOING ERRANDS ALONE?: NO

## 2023-09-02 ASSESSMENT — ACTIVITIES OF DAILY LIVING (ADL)
ADL_BEFORE_ADMISSION: INDEPENDENT
ADL_BEFORE_ADMISSION: INDEPENDENT
RECENT_DECLINE_ADL: NO
ADL_SCORE: 12
RECENT_DECLINE_ADL: NO
ADL_SCORE: 12
ADL_SHORT_OF_BREATH: NO
ADL_SHORT_OF_BREATH: NO

## 2023-09-02 ASSESSMENT — ENCOUNTER SYMPTOMS
COUGH: 0
FEVER: 0
NERVOUS/ANXIOUS: 0
RHINORRHEA: 0
WOUND: 0
CONSTIPATION: 0
CHILLS: 0
COLOR CHANGE: 0
SHORTNESS OF BREATH: 0
HEADACHES: 0
ABDOMINAL PAIN: 0
DIARRHEA: 0
DIZZINESS: 0
BACK PAIN: 0

## 2023-09-02 ASSESSMENT — PATIENT HEALTH QUESTIONNAIRE - PHQ9
2. FEELING DOWN, DEPRESSED OR HOPELESS: NOT AT ALL
IS PATIENT ABLE TO COMPLETE PHQ2 OR PHQ9: YES
SUM OF ALL RESPONSES TO PHQ9 QUESTIONS 1 AND 2: 0
CLINICAL INTERPRETATION OF PHQ2 SCORE: NO FURTHER SCREENING NEEDED
SUM OF ALL RESPONSES TO PHQ9 QUESTIONS 1 AND 2: 0
1. LITTLE INTEREST OR PLEASURE IN DOING THINGS: NOT AT ALL

## 2023-09-02 ASSESSMENT — COLUMBIA-SUICIDE SEVERITY RATING SCALE - C-SSRS
2. HAVE YOU ACTUALLY HAD ANY THOUGHTS OF KILLING YOURSELF?: NO
1. WITHIN THE PAST MONTH, HAVE YOU WISHED YOU WERE DEAD OR WISHED YOU COULD GO TO SLEEP AND NOT WAKE UP?: NO
IS THE PATIENT ABLE TO COMPLETE C-SSRS: YES
6. HAVE YOU EVER DONE ANYTHING, STARTED TO DO ANYTHING, OR PREPARED TO DO ANYTHING TO END YOUR LIFE?: NO

## 2023-09-02 ASSESSMENT — LIFESTYLE VARIABLES
AUDIT-C TOTAL SCORE: 1
HOW MANY STANDARD DRINKS CONTAINING ALCOHOL DO YOU HAVE ON A TYPICAL DAY: 0,1 OR 2
HOW MANY STANDARD DRINKS CONTAINING ALCOHOL DO YOU HAVE ON A TYPICAL DAY: 0,1 OR 2
HOW OFTEN DO YOU HAVE A DRINK CONTAINING ALCOHOL: MONTHLY OR LESS
AUDIT-C TOTAL SCORE: 1
ALCOHOL_USE_STATUS: NO OR LOW RISK WITH VALIDATED TOOL
HOW OFTEN DO YOU HAVE 6 OR MORE DRINKS ON ONE OCCASION: NEVER
ALCOHOL_USE_STATUS: NO OR LOW RISK WITH VALIDATED TOOL
HOW OFTEN DO YOU HAVE 6 OR MORE DRINKS ON ONE OCCASION: NEVER
HOW OFTEN DO YOU HAVE A DRINK CONTAINING ALCOHOL: MONTHLY OR LESS

## 2023-09-02 ASSESSMENT — PAIN SCALES - GENERAL
PAINLEVEL_OUTOF10: 0

## 2023-09-02 ASSESSMENT — ORIENTATION MEMORY CONCENTRATION TEST (OMCT)
WHAT MONTH IS IT NOW: CORRECT
WHAT TIME IS IT (NO WATCH OR CLOCK): CORRECT
REPEAT THE NAME AND ADDRESS I ASKED YOU TO REMEMBER: CORRECT
COUNT BACKWARDS FROM 20 TO 1: CORRECT
OMCT INTERPRETATION: 0-6: NO SIGNIFICANT IMPAIRMENT
OMCT SCORE: 0
SAY THE MONTHS IN REVERSE ORDER STARTING WITH LAST MONTH: CORRECT
WHAT YEAR IS IT NOW (MUST BE EXACT): CORRECT

## 2023-09-03 VITALS
HEIGHT: 66 IN | HEART RATE: 55 BPM | TEMPERATURE: 97.5 F | WEIGHT: 121.03 LBS | SYSTOLIC BLOOD PRESSURE: 134 MMHG | DIASTOLIC BLOOD PRESSURE: 99 MMHG | RESPIRATION RATE: 18 BRPM | BODY MASS INDEX: 19.45 KG/M2 | OXYGEN SATURATION: 99 %

## 2023-09-03 LAB
APTT PPP: 41 SEC (ref 22–30)
BACTERIA UR CULT: NORMAL
DEPRECATED RDW RBC: 44.7 FL (ref 39–50)
ERYTHROCYTE [DISTWIDTH] IN BLOOD: 12.8 % (ref 11–15)
HCT VFR BLD CALC: 39.6 % (ref 36–46.5)
HGB BLD-MCNC: 13.3 G/DL (ref 12–15.5)
INR PPP: 1.7
MCH RBC QN AUTO: 31.7 PG (ref 26–34)
MCHC RBC AUTO-ENTMCNC: 33.6 G/DL (ref 32–36.5)
MCV RBC AUTO: 94.5 FL (ref 78–100)
NRBC BLD MANUAL-RTO: 0 /100 WBC
PLATELET # BLD AUTO: 141 K/MCL (ref 140–450)
PROTHROMBIN TIME: 15.8 SEC (ref 9.7–11.8)
RAINBOW EXTRA TUBES HOLD SPECIMEN: NORMAL
RAINBOW EXTRA TUBES HOLD SPECIMEN: NORMAL
RBC # BLD: 4.19 MIL/MCL (ref 4–5.2)
WBC # BLD: 4.7 K/MCL (ref 4.2–11)

## 2023-09-03 PROCEDURE — 99233 SBSQ HOSP IP/OBS HIGH 50: CPT | Performed by: INTERNAL MEDICINE

## 2023-09-03 PROCEDURE — 85610 PROTHROMBIN TIME: CPT | Performed by: INTERNAL MEDICINE

## 2023-09-03 PROCEDURE — 10002800 HB RX 250 W HCPCS: Performed by: EMERGENCY MEDICINE

## 2023-09-03 PROCEDURE — 10002803 HB RX 637: Performed by: INTERNAL MEDICINE

## 2023-09-03 PROCEDURE — 85027 COMPLETE CBC AUTOMATED: CPT | Performed by: EMERGENCY MEDICINE

## 2023-09-03 PROCEDURE — 10002800 HB RX 250 W HCPCS: Performed by: INTERNAL MEDICINE

## 2023-09-03 PROCEDURE — 10004651 HB RX, NO CHARGE ITEM: Performed by: INTERNAL MEDICINE

## 2023-09-03 PROCEDURE — 99239 HOSP IP/OBS DSCHRG MGMT >30: CPT | Performed by: INTERNAL MEDICINE

## 2023-09-03 PROCEDURE — 36415 COLL VENOUS BLD VENIPUNCTURE: CPT | Performed by: EMERGENCY MEDICINE

## 2023-09-03 PROCEDURE — 85730 THROMBOPLASTIN TIME PARTIAL: CPT | Performed by: INTERNAL MEDICINE

## 2023-09-03 RX ORDER — WARFARIN SODIUM 5 MG/1
5 TABLET ORAL EVERY EVENING
Status: DISCONTINUED | OUTPATIENT
Start: 2023-09-03 | End: 2023-09-03 | Stop reason: HOSPADM

## 2023-09-03 RX ADMIN — HEPARIN SODIUM 14 UNITS/KG/HR: 10000 INJECTION, SOLUTION INTRAVENOUS at 06:46

## 2023-09-03 RX ADMIN — CEFTRIAXONE SODIUM 1000 MG: 10 INJECTION, POWDER, FOR SOLUTION INTRAVENOUS at 07:55

## 2023-09-03 RX ADMIN — HEPARIN SODIUM 1700 UNITS: 1000 INJECTION INTRAVENOUS; SUBCUTANEOUS at 06:31

## 2023-09-03 RX ADMIN — SODIUM CHLORIDE, PRESERVATIVE FREE 2 ML: 5 INJECTION INTRAVENOUS at 07:56

## 2023-09-03 RX ADMIN — DILTIAZEM HYDROCHLORIDE 120 MG: 120 CAPSULE, EXTENDED RELEASE ORAL at 07:55

## 2023-09-05 ENCOUNTER — APPOINTMENT (OUTPATIENT)
Dept: RADIATION ONCOLOGY | Age: 83
End: 2023-09-05
Attending: RADIOLOGY

## 2023-09-06 ENCOUNTER — APPOINTMENT (OUTPATIENT)
Dept: RADIATION ONCOLOGY | Age: 83
End: 2023-09-06
Attending: RADIOLOGY

## 2023-09-07 ENCOUNTER — APPOINTMENT (OUTPATIENT)
Dept: RADIATION ONCOLOGY | Age: 83
End: 2023-09-07
Attending: RADIOLOGY

## 2023-09-07 LAB
P AXIS (DEGREES): 69
PR-INTERVAL (MSEC): 172
QRS-INTERVAL (MSEC): 88
QRS-INTERVAL (MSEC): 98
QT-INTERVAL (MSEC): 326
QT-INTERVAL (MSEC): 409
QTC: 394
QTC: 409
R AXIS (DEGREES): 30
R AXIS (DEGREES): 53
REPORT TEXT: NORMAL
REPORT TEXT: NORMAL
T AXIS (DEGREES): 15
T AXIS (DEGREES): 55
VENTRICULAR RATE EKG/MIN (BPM): 114
VENTRICULAR RATE EKG/MIN (BPM): 59

## 2023-09-08 ENCOUNTER — APPOINTMENT (OUTPATIENT)
Dept: RADIATION ONCOLOGY | Age: 83
End: 2023-09-08
Attending: RADIOLOGY

## 2023-09-11 ENCOUNTER — APPOINTMENT (OUTPATIENT)
Dept: RADIATION ONCOLOGY | Age: 83
End: 2023-09-11
Attending: RADIOLOGY

## 2023-09-12 ENCOUNTER — APPOINTMENT (OUTPATIENT)
Dept: RADIATION ONCOLOGY | Age: 83
End: 2023-09-12
Attending: RADIOLOGY

## 2023-09-13 ENCOUNTER — HOSPITAL ENCOUNTER (OUTPATIENT)
Dept: RADIATION ONCOLOGY | Age: 83
Discharge: STILL ADMITTED OR ADMINISTRATIVE | End: 2023-09-13
Attending: RADIOLOGY

## 2023-09-13 ENCOUNTER — APPOINTMENT (OUTPATIENT)
Dept: RADIATION ONCOLOGY | Age: 83
End: 2023-09-13
Attending: RADIOLOGY

## 2023-09-13 DIAGNOSIS — C50.912 LEFT BREAST CANCER WITH T3 TUMOR, >5 CM IN GREATEST DIMENSION (CMD): Primary | ICD-10-CM

## 2023-09-13 DIAGNOSIS — C50.912 LEFT BREAST CANCER WITH T3 TUMOR, >5 CM IN GREATEST DIMENSION (CMD): ICD-10-CM

## 2023-09-13 PROCEDURE — 77334 RADIATION TREATMENT AID(S): CPT | Performed by: RADIOLOGY

## 2023-09-13 PROCEDURE — 77290 THER RAD SIMULAJ FIELD CPLX: CPT | Performed by: RADIOLOGY

## 2023-09-14 ENCOUNTER — APPOINTMENT (OUTPATIENT)
Dept: RADIATION ONCOLOGY | Age: 83
End: 2023-09-14
Attending: RADIOLOGY

## 2023-09-15 ENCOUNTER — APPOINTMENT (OUTPATIENT)
Dept: RADIATION ONCOLOGY | Age: 83
End: 2023-09-15
Attending: RADIOLOGY

## 2023-09-18 ENCOUNTER — TELEPHONE (OUTPATIENT)
Dept: ONCOLOGY | Age: 83
End: 2023-09-18

## 2023-09-18 ENCOUNTER — APPOINTMENT (OUTPATIENT)
Dept: RADIATION ONCOLOGY | Age: 83
End: 2023-09-18
Attending: RADIOLOGY

## 2023-09-19 ENCOUNTER — APPOINTMENT (OUTPATIENT)
Dept: RADIATION ONCOLOGY | Age: 83
End: 2023-09-19
Attending: RADIOLOGY

## 2023-09-20 ENCOUNTER — APPOINTMENT (OUTPATIENT)
Dept: RADIATION ONCOLOGY | Age: 83
End: 2023-09-20
Attending: RADIOLOGY

## 2023-09-21 ENCOUNTER — APPOINTMENT (OUTPATIENT)
Dept: RADIATION ONCOLOGY | Age: 83
End: 2023-09-21
Attending: RADIOLOGY

## 2023-09-22 ENCOUNTER — APPOINTMENT (OUTPATIENT)
Dept: RADIATION ONCOLOGY | Age: 83
End: 2023-09-22
Attending: RADIOLOGY

## 2023-09-22 ENCOUNTER — IMAGING SERVICES (OUTPATIENT)
Dept: OTHER | Age: 83
End: 2023-09-22

## 2023-09-25 ENCOUNTER — APPOINTMENT (OUTPATIENT)
Dept: RADIATION ONCOLOGY | Age: 83
End: 2023-09-25
Attending: RADIOLOGY

## 2023-09-26 ENCOUNTER — APPOINTMENT (OUTPATIENT)
Dept: RADIATION ONCOLOGY | Age: 83
End: 2023-09-26
Attending: RADIOLOGY

## 2023-09-26 ENCOUNTER — TELEPHONE (OUTPATIENT)
Dept: ONCOLOGY | Age: 83
End: 2023-09-26

## 2023-09-27 ENCOUNTER — APPOINTMENT (OUTPATIENT)
Dept: RADIATION ONCOLOGY | Age: 83
End: 2023-09-27
Attending: RADIOLOGY

## 2023-09-27 DIAGNOSIS — C50.912 LEFT BREAST CANCER WITH T3 TUMOR, >5 CM IN GREATEST DIMENSION (CMD): Primary | ICD-10-CM

## 2023-09-28 ENCOUNTER — APPOINTMENT (OUTPATIENT)
Dept: RADIATION ONCOLOGY | Age: 83
End: 2023-09-28
Attending: RADIOLOGY

## 2023-09-29 ENCOUNTER — APPOINTMENT (OUTPATIENT)
Dept: RADIATION ONCOLOGY | Age: 83
End: 2023-09-29
Attending: RADIOLOGY

## 2023-09-29 ENCOUNTER — HOSPITAL ENCOUNTER (OUTPATIENT)
Dept: RADIATION ONCOLOGY | Age: 83
Discharge: STILL ADMITTED OR ADMINISTRATIVE | End: 2023-09-29
Attending: RADIOLOGY

## 2023-09-29 DIAGNOSIS — C50.912 LEFT BREAST CANCER WITH T3 TUMOR, >5 CM IN GREATEST DIMENSION (CMD): ICD-10-CM

## 2023-10-02 ENCOUNTER — APPOINTMENT (OUTPATIENT)
Dept: RADIATION ONCOLOGY | Age: 83
End: 2023-10-02
Attending: RADIOLOGY

## 2023-10-03 ENCOUNTER — APPOINTMENT (OUTPATIENT)
Dept: RADIATION ONCOLOGY | Age: 83
End: 2023-10-03
Attending: RADIOLOGY

## 2023-10-03 PROCEDURE — 77295 3-D RADIOTHERAPY PLAN: CPT | Performed by: RADIOLOGY

## 2023-10-03 PROCEDURE — 77334 RADIATION TREATMENT AID(S): CPT | Performed by: RADIOLOGY

## 2023-10-03 PROCEDURE — 77300 RADIATION THERAPY DOSE PLAN: CPT | Performed by: RADIOLOGY

## 2023-10-06 ENCOUNTER — HOSPITAL ENCOUNTER (OUTPATIENT)
Dept: RADIATION ONCOLOGY | Age: 83
Discharge: STILL ADMITTED OR ADMINISTRATIVE | End: 2023-10-06
Attending: RADIOLOGY

## 2023-10-06 ENCOUNTER — APPOINTMENT (OUTPATIENT)
Dept: RADIATION ONCOLOGY | Age: 83
End: 2023-10-06
Attending: RADIOLOGY

## 2023-10-06 PROCEDURE — 77280 THER RAD SIMULAJ FIELD SMPL: CPT | Performed by: RADIOLOGY

## 2023-10-09 ENCOUNTER — HOSPITAL ENCOUNTER (OUTPATIENT)
Dept: RADIATION ONCOLOGY | Age: 83
Discharge: STILL ADMITTED OR ADMINISTRATIVE | End: 2023-10-09
Attending: RADIOLOGY

## 2023-10-09 LAB
RAD ONC ARIA COURSE ID: NORMAL
RAD ONC ARIA COURSE LAST TREATMENT DATE: NORMAL
RAD ONC ARIA COURSE START DATE: NORMAL
RAD ONC ARIA COURSE TREATMENT ELAPSED DAYS: 0
RAD ONC ARIA FIRST TREATMENT DATE: NORMAL
RAD ONC ARIA PLAN FRACTIONS TREATED TO DATE: 1
RAD ONC ARIA PLAN ID: NORMAL
RAD ONC ARIA PLAN PRESCRIBED DOSE PER FRACTION: 2 GY
RAD ONC ARIA PLAN PRIMARY REFERENCE POINT: NORMAL
RAD ONC ARIA PLAN TOTAL FRACTIONS PRESCRIBED: 25
RAD ONC ARIA PLAN TOTAL PRESCRIBED DOSE: 5000 CGY
RAD ONC ARIA REFERENCE POINT DOSAGE GIVEN TO DATE: 2 GY
RAD ONC ARIA REFERENCE POINT ID: NORMAL
RAD ONC ARIA REFERENCE POINT SESSION DOSAGE GIVEN: 2 GY

## 2023-10-09 PROCEDURE — 77427 RADIATION TX MANAGEMENT X5: CPT | Performed by: RADIOLOGY

## 2023-10-09 PROCEDURE — 77417 THER RADIOLOGY PORT IMAGE(S): CPT | Performed by: RADIOLOGY

## 2023-10-09 PROCEDURE — 77412 RADIATION TX DELIVERY LVL 3: CPT | Performed by: RADIOLOGY

## 2023-10-10 ENCOUNTER — HOSPITAL ENCOUNTER (OUTPATIENT)
Dept: RADIATION ONCOLOGY | Age: 83
Discharge: STILL ADMITTED OR ADMINISTRATIVE | End: 2023-10-10
Attending: RADIOLOGY

## 2023-10-10 LAB
RAD ONC ARIA COURSE ID: NORMAL
RAD ONC ARIA COURSE LAST TREATMENT DATE: NORMAL
RAD ONC ARIA COURSE START DATE: NORMAL
RAD ONC ARIA COURSE TREATMENT ELAPSED DAYS: 1
RAD ONC ARIA FIRST TREATMENT DATE: NORMAL
RAD ONC ARIA PLAN FRACTIONS TREATED TO DATE: 2
RAD ONC ARIA PLAN ID: NORMAL
RAD ONC ARIA PLAN PRESCRIBED DOSE PER FRACTION: 2 GY
RAD ONC ARIA PLAN PRIMARY REFERENCE POINT: NORMAL
RAD ONC ARIA PLAN TOTAL FRACTIONS PRESCRIBED: 25
RAD ONC ARIA PLAN TOTAL PRESCRIBED DOSE: 5000 CGY
RAD ONC ARIA REFERENCE POINT DOSAGE GIVEN TO DATE: 4 GY
RAD ONC ARIA REFERENCE POINT ID: NORMAL
RAD ONC ARIA REFERENCE POINT SESSION DOSAGE GIVEN: 2 GY

## 2023-10-10 PROCEDURE — 77412 RADIATION TX DELIVERY LVL 3: CPT | Performed by: RADIOLOGY

## 2023-10-11 ENCOUNTER — HOSPITAL ENCOUNTER (OUTPATIENT)
Dept: RADIATION ONCOLOGY | Age: 83
Discharge: STILL ADMITTED OR ADMINISTRATIVE | End: 2023-10-11
Attending: RADIOLOGY

## 2023-10-11 VITALS
RESPIRATION RATE: 16 BRPM | HEART RATE: 58 BPM | DIASTOLIC BLOOD PRESSURE: 75 MMHG | OXYGEN SATURATION: 100 % | SYSTOLIC BLOOD PRESSURE: 148 MMHG

## 2023-10-11 LAB
RAD ONC ARIA COURSE ID: NORMAL
RAD ONC ARIA COURSE LAST TREATMENT DATE: NORMAL
RAD ONC ARIA COURSE START DATE: NORMAL
RAD ONC ARIA COURSE TREATMENT ELAPSED DAYS: 2
RAD ONC ARIA FIRST TREATMENT DATE: NORMAL
RAD ONC ARIA PLAN FRACTIONS TREATED TO DATE: 3
RAD ONC ARIA PLAN ID: NORMAL
RAD ONC ARIA PLAN PRESCRIBED DOSE PER FRACTION: 2 GY
RAD ONC ARIA PLAN PRIMARY REFERENCE POINT: NORMAL
RAD ONC ARIA PLAN TOTAL FRACTIONS PRESCRIBED: 25
RAD ONC ARIA PLAN TOTAL PRESCRIBED DOSE: 5000 CGY
RAD ONC ARIA REFERENCE POINT DOSAGE GIVEN TO DATE: 6 GY
RAD ONC ARIA REFERENCE POINT ID: NORMAL
RAD ONC ARIA REFERENCE POINT SESSION DOSAGE GIVEN: 2 GY

## 2023-10-11 PROCEDURE — 77412 RADIATION TX DELIVERY LVL 3: CPT | Performed by: RADIOLOGY

## 2023-10-11 ASSESSMENT — PAIN SCALES - GENERAL: PAINLEVEL: 0

## 2023-10-12 ENCOUNTER — APPOINTMENT (OUTPATIENT)
Dept: RADIATION ONCOLOGY | Age: 83
End: 2023-10-12
Attending: RADIOLOGY

## 2023-10-12 ENCOUNTER — HOSPITAL ENCOUNTER (OUTPATIENT)
Dept: RADIATION ONCOLOGY | Age: 83
Discharge: STILL ADMITTED OR ADMINISTRATIVE | End: 2023-10-12
Attending: RADIOLOGY

## 2023-10-12 LAB
RAD ONC ARIA COURSE ID: NORMAL
RAD ONC ARIA COURSE LAST TREATMENT DATE: NORMAL
RAD ONC ARIA COURSE START DATE: NORMAL
RAD ONC ARIA COURSE TREATMENT ELAPSED DAYS: 3
RAD ONC ARIA FIRST TREATMENT DATE: NORMAL
RAD ONC ARIA PLAN FRACTIONS TREATED TO DATE: 4
RAD ONC ARIA PLAN ID: NORMAL
RAD ONC ARIA PLAN PRESCRIBED DOSE PER FRACTION: 2 GY
RAD ONC ARIA PLAN PRIMARY REFERENCE POINT: NORMAL
RAD ONC ARIA PLAN TOTAL FRACTIONS PRESCRIBED: 25
RAD ONC ARIA PLAN TOTAL PRESCRIBED DOSE: 5000 CGY
RAD ONC ARIA REFERENCE POINT DOSAGE GIVEN TO DATE: 8 GY
RAD ONC ARIA REFERENCE POINT ID: NORMAL
RAD ONC ARIA REFERENCE POINT SESSION DOSAGE GIVEN: 2 GY

## 2023-10-12 PROCEDURE — 77412 RADIATION TX DELIVERY LVL 3: CPT | Performed by: RADIOLOGY

## 2023-10-13 ENCOUNTER — APPOINTMENT (OUTPATIENT)
Dept: RADIATION ONCOLOGY | Age: 83
End: 2023-10-13
Attending: RADIOLOGY

## 2023-10-13 PROCEDURE — 77336 RADIATION PHYSICS CONSULT: CPT | Performed by: RADIOLOGY

## 2023-10-16 ENCOUNTER — HOSPITAL ENCOUNTER (OUTPATIENT)
Dept: RADIATION ONCOLOGY | Age: 83
Discharge: STILL ADMITTED OR ADMINISTRATIVE | End: 2023-10-16
Attending: RADIOLOGY

## 2023-10-16 ENCOUNTER — APPOINTMENT (OUTPATIENT)
Dept: RADIATION ONCOLOGY | Age: 83
End: 2023-10-16
Attending: RADIOLOGY

## 2023-10-16 LAB
RAD ONC ARIA COURSE ID: NORMAL
RAD ONC ARIA COURSE LAST TREATMENT DATE: NORMAL
RAD ONC ARIA COURSE START DATE: NORMAL
RAD ONC ARIA COURSE TREATMENT ELAPSED DAYS: 7
RAD ONC ARIA FIRST TREATMENT DATE: NORMAL
RAD ONC ARIA PLAN FRACTIONS TREATED TO DATE: 5
RAD ONC ARIA PLAN ID: NORMAL
RAD ONC ARIA PLAN PRESCRIBED DOSE PER FRACTION: 2 GY
RAD ONC ARIA PLAN PRIMARY REFERENCE POINT: NORMAL
RAD ONC ARIA PLAN TOTAL FRACTIONS PRESCRIBED: 25
RAD ONC ARIA PLAN TOTAL PRESCRIBED DOSE: 5000 CGY
RAD ONC ARIA REFERENCE POINT DOSAGE GIVEN TO DATE: 10 GY
RAD ONC ARIA REFERENCE POINT ID: NORMAL
RAD ONC ARIA REFERENCE POINT SESSION DOSAGE GIVEN: 2 GY

## 2023-10-16 PROCEDURE — 77417 THER RADIOLOGY PORT IMAGE(S): CPT | Performed by: RADIOLOGY

## 2023-10-16 PROCEDURE — 77412 RADIATION TX DELIVERY LVL 3: CPT | Performed by: RADIOLOGY

## 2023-10-17 ENCOUNTER — HOSPITAL ENCOUNTER (OUTPATIENT)
Dept: RADIATION ONCOLOGY | Age: 83
Discharge: STILL ADMITTED OR ADMINISTRATIVE | End: 2023-10-17
Attending: RADIOLOGY

## 2023-10-17 ENCOUNTER — APPOINTMENT (OUTPATIENT)
Dept: RADIATION ONCOLOGY | Age: 83
End: 2023-10-17
Attending: RADIOLOGY

## 2023-10-17 LAB
RAD ONC ARIA COURSE ID: NORMAL
RAD ONC ARIA COURSE LAST TREATMENT DATE: NORMAL
RAD ONC ARIA COURSE START DATE: NORMAL
RAD ONC ARIA COURSE TREATMENT ELAPSED DAYS: 8
RAD ONC ARIA FIRST TREATMENT DATE: NORMAL
RAD ONC ARIA PLAN FRACTIONS TREATED TO DATE: 6
RAD ONC ARIA PLAN ID: NORMAL
RAD ONC ARIA PLAN PRESCRIBED DOSE PER FRACTION: 2 GY
RAD ONC ARIA PLAN PRIMARY REFERENCE POINT: NORMAL
RAD ONC ARIA PLAN TOTAL FRACTIONS PRESCRIBED: 25
RAD ONC ARIA PLAN TOTAL PRESCRIBED DOSE: 5000 CGY
RAD ONC ARIA REFERENCE POINT DOSAGE GIVEN TO DATE: 12 GY
RAD ONC ARIA REFERENCE POINT ID: NORMAL
RAD ONC ARIA REFERENCE POINT SESSION DOSAGE GIVEN: 2 GY

## 2023-10-17 PROCEDURE — 77427 RADIATION TX MANAGEMENT X5: CPT | Performed by: RADIOLOGY

## 2023-10-17 PROCEDURE — 77412 RADIATION TX DELIVERY LVL 3: CPT | Performed by: RADIOLOGY

## 2023-10-18 ENCOUNTER — HOSPITAL ENCOUNTER (OUTPATIENT)
Dept: RADIATION ONCOLOGY | Age: 83
Discharge: STILL ADMITTED OR ADMINISTRATIVE | End: 2023-10-18
Attending: RADIOLOGY

## 2023-10-18 ENCOUNTER — APPOINTMENT (OUTPATIENT)
Dept: RADIATION ONCOLOGY | Age: 83
End: 2023-10-18
Attending: RADIOLOGY

## 2023-10-18 LAB
RAD ONC ARIA COURSE ID: NORMAL
RAD ONC ARIA COURSE LAST TREATMENT DATE: NORMAL
RAD ONC ARIA COURSE START DATE: NORMAL
RAD ONC ARIA COURSE TREATMENT ELAPSED DAYS: 9
RAD ONC ARIA FIRST TREATMENT DATE: NORMAL
RAD ONC ARIA PLAN FRACTIONS TREATED TO DATE: 7
RAD ONC ARIA PLAN ID: NORMAL
RAD ONC ARIA PLAN PRESCRIBED DOSE PER FRACTION: 2 GY
RAD ONC ARIA PLAN PRIMARY REFERENCE POINT: NORMAL
RAD ONC ARIA PLAN TOTAL FRACTIONS PRESCRIBED: 25
RAD ONC ARIA PLAN TOTAL PRESCRIBED DOSE: 5000 CGY
RAD ONC ARIA REFERENCE POINT DOSAGE GIVEN TO DATE: 14 GY
RAD ONC ARIA REFERENCE POINT ID: NORMAL
RAD ONC ARIA REFERENCE POINT SESSION DOSAGE GIVEN: 2 GY

## 2023-10-18 PROCEDURE — 77412 RADIATION TX DELIVERY LVL 3: CPT | Performed by: RADIOLOGY

## 2023-10-19 ENCOUNTER — APPOINTMENT (OUTPATIENT)
Dept: RADIATION ONCOLOGY | Age: 83
End: 2023-10-19
Attending: RADIOLOGY

## 2023-10-19 ENCOUNTER — HOSPITAL ENCOUNTER (OUTPATIENT)
Dept: RADIATION ONCOLOGY | Age: 83
Discharge: STILL ADMITTED OR ADMINISTRATIVE | End: 2023-10-19
Attending: RADIOLOGY

## 2023-10-19 LAB
RAD ONC ARIA COURSE ID: NORMAL
RAD ONC ARIA COURSE LAST TREATMENT DATE: NORMAL
RAD ONC ARIA COURSE START DATE: NORMAL
RAD ONC ARIA COURSE TREATMENT ELAPSED DAYS: 10
RAD ONC ARIA FIRST TREATMENT DATE: NORMAL
RAD ONC ARIA PLAN FRACTIONS TREATED TO DATE: 8
RAD ONC ARIA PLAN ID: NORMAL
RAD ONC ARIA PLAN PRESCRIBED DOSE PER FRACTION: 2 GY
RAD ONC ARIA PLAN PRIMARY REFERENCE POINT: NORMAL
RAD ONC ARIA PLAN TOTAL FRACTIONS PRESCRIBED: 25
RAD ONC ARIA PLAN TOTAL PRESCRIBED DOSE: 5000 CGY
RAD ONC ARIA REFERENCE POINT DOSAGE GIVEN TO DATE: 16 GY
RAD ONC ARIA REFERENCE POINT ID: NORMAL
RAD ONC ARIA REFERENCE POINT SESSION DOSAGE GIVEN: 2 GY

## 2023-10-19 PROCEDURE — 77412 RADIATION TX DELIVERY LVL 3: CPT | Performed by: RADIOLOGY

## 2023-10-20 ENCOUNTER — APPOINTMENT (OUTPATIENT)
Dept: RADIATION ONCOLOGY | Age: 83
End: 2023-10-20
Attending: RADIOLOGY

## 2023-10-20 ENCOUNTER — HOSPITAL ENCOUNTER (OUTPATIENT)
Dept: RADIATION ONCOLOGY | Age: 83
Discharge: STILL ADMITTED OR ADMINISTRATIVE | End: 2023-10-20
Attending: RADIOLOGY

## 2023-10-20 VITALS — HEART RATE: 55 BPM | RESPIRATION RATE: 16 BRPM | OXYGEN SATURATION: 98 %

## 2023-10-20 LAB
RAD ONC ARIA COURSE ID: NORMAL
RAD ONC ARIA COURSE LAST TREATMENT DATE: NORMAL
RAD ONC ARIA COURSE START DATE: NORMAL
RAD ONC ARIA COURSE TREATMENT ELAPSED DAYS: 11
RAD ONC ARIA FIRST TREATMENT DATE: NORMAL
RAD ONC ARIA PLAN FRACTIONS TREATED TO DATE: 9
RAD ONC ARIA PLAN ID: NORMAL
RAD ONC ARIA PLAN PRESCRIBED DOSE PER FRACTION: 2 GY
RAD ONC ARIA PLAN PRIMARY REFERENCE POINT: NORMAL
RAD ONC ARIA PLAN TOTAL FRACTIONS PRESCRIBED: 25
RAD ONC ARIA PLAN TOTAL PRESCRIBED DOSE: 5000 CGY
RAD ONC ARIA REFERENCE POINT DOSAGE GIVEN TO DATE: 18 GY
RAD ONC ARIA REFERENCE POINT ID: NORMAL
RAD ONC ARIA REFERENCE POINT SESSION DOSAGE GIVEN: 2 GY

## 2023-10-20 PROCEDURE — 77412 RADIATION TX DELIVERY LVL 3: CPT | Performed by: RADIOLOGY

## 2023-10-20 ASSESSMENT — PAIN SCALES - GENERAL: PAINLEVEL: 0

## 2023-10-23 ENCOUNTER — APPOINTMENT (OUTPATIENT)
Dept: RADIATION ONCOLOGY | Age: 83
End: 2023-10-23
Attending: RADIOLOGY

## 2023-10-23 ENCOUNTER — HOSPITAL ENCOUNTER (OUTPATIENT)
Dept: RADIATION ONCOLOGY | Age: 83
Discharge: STILL ADMITTED OR ADMINISTRATIVE | End: 2023-10-23
Attending: RADIOLOGY

## 2023-10-23 LAB
RAD ONC ARIA COURSE ID: NORMAL
RAD ONC ARIA COURSE LAST TREATMENT DATE: NORMAL
RAD ONC ARIA COURSE START DATE: NORMAL
RAD ONC ARIA COURSE TREATMENT ELAPSED DAYS: 14
RAD ONC ARIA FIRST TREATMENT DATE: NORMAL
RAD ONC ARIA PLAN FRACTIONS TREATED TO DATE: 10
RAD ONC ARIA PLAN ID: NORMAL
RAD ONC ARIA PLAN PRESCRIBED DOSE PER FRACTION: 2 GY
RAD ONC ARIA PLAN PRIMARY REFERENCE POINT: NORMAL
RAD ONC ARIA PLAN TOTAL FRACTIONS PRESCRIBED: 25
RAD ONC ARIA PLAN TOTAL PRESCRIBED DOSE: 5000 CGY
RAD ONC ARIA REFERENCE POINT DOSAGE GIVEN TO DATE: 20 GY
RAD ONC ARIA REFERENCE POINT ID: NORMAL
RAD ONC ARIA REFERENCE POINT SESSION DOSAGE GIVEN: 2 GY

## 2023-10-23 PROCEDURE — 77336 RADIATION PHYSICS CONSULT: CPT | Performed by: RADIOLOGY

## 2023-10-23 PROCEDURE — 77417 THER RADIOLOGY PORT IMAGE(S): CPT | Performed by: RADIOLOGY

## 2023-10-23 PROCEDURE — 77412 RADIATION TX DELIVERY LVL 3: CPT | Performed by: RADIOLOGY

## 2023-10-24 ENCOUNTER — HOSPITAL ENCOUNTER (OUTPATIENT)
Dept: RADIATION ONCOLOGY | Age: 83
Discharge: STILL ADMITTED OR ADMINISTRATIVE | End: 2023-10-24
Attending: RADIOLOGY

## 2023-10-24 ENCOUNTER — APPOINTMENT (OUTPATIENT)
Dept: RADIATION ONCOLOGY | Age: 83
End: 2023-10-24
Attending: RADIOLOGY

## 2023-10-24 LAB
RAD ONC ARIA COURSE ID: NORMAL
RAD ONC ARIA COURSE LAST TREATMENT DATE: NORMAL
RAD ONC ARIA COURSE START DATE: NORMAL
RAD ONC ARIA COURSE TREATMENT ELAPSED DAYS: 15
RAD ONC ARIA FIRST TREATMENT DATE: NORMAL
RAD ONC ARIA PLAN FRACTIONS TREATED TO DATE: 11
RAD ONC ARIA PLAN ID: NORMAL
RAD ONC ARIA PLAN PRESCRIBED DOSE PER FRACTION: 2 GY
RAD ONC ARIA PLAN PRIMARY REFERENCE POINT: NORMAL
RAD ONC ARIA PLAN TOTAL FRACTIONS PRESCRIBED: 25
RAD ONC ARIA PLAN TOTAL PRESCRIBED DOSE: 5000 CGY
RAD ONC ARIA REFERENCE POINT DOSAGE GIVEN TO DATE: 22 GY
RAD ONC ARIA REFERENCE POINT ID: NORMAL
RAD ONC ARIA REFERENCE POINT SESSION DOSAGE GIVEN: 2 GY

## 2023-10-24 PROCEDURE — 77412 RADIATION TX DELIVERY LVL 3: CPT | Performed by: RADIOLOGY

## 2023-10-24 PROCEDURE — 77427 RADIATION TX MANAGEMENT X5: CPT | Performed by: RADIOLOGY

## 2023-10-25 ENCOUNTER — HOSPITAL ENCOUNTER (OUTPATIENT)
Dept: RADIATION ONCOLOGY | Age: 83
Discharge: STILL ADMITTED OR ADMINISTRATIVE | End: 2023-10-25
Attending: RADIOLOGY

## 2023-10-25 ENCOUNTER — APPOINTMENT (OUTPATIENT)
Dept: RADIATION ONCOLOGY | Age: 83
End: 2023-10-25
Attending: RADIOLOGY

## 2023-10-25 LAB
RAD ONC ARIA COURSE ID: NORMAL
RAD ONC ARIA COURSE LAST TREATMENT DATE: NORMAL
RAD ONC ARIA COURSE START DATE: NORMAL
RAD ONC ARIA COURSE TREATMENT ELAPSED DAYS: 16
RAD ONC ARIA FIRST TREATMENT DATE: NORMAL
RAD ONC ARIA PLAN FRACTIONS TREATED TO DATE: 12
RAD ONC ARIA PLAN ID: NORMAL
RAD ONC ARIA PLAN PRESCRIBED DOSE PER FRACTION: 2 GY
RAD ONC ARIA PLAN PRIMARY REFERENCE POINT: NORMAL
RAD ONC ARIA PLAN TOTAL FRACTIONS PRESCRIBED: 25
RAD ONC ARIA PLAN TOTAL PRESCRIBED DOSE: 5000 CGY
RAD ONC ARIA REFERENCE POINT DOSAGE GIVEN TO DATE: 24 GY
RAD ONC ARIA REFERENCE POINT ID: NORMAL
RAD ONC ARIA REFERENCE POINT SESSION DOSAGE GIVEN: 2 GY

## 2023-10-25 PROCEDURE — 77412 RADIATION TX DELIVERY LVL 3: CPT | Performed by: RADIOLOGY

## 2023-10-26 ENCOUNTER — EXTERNAL RECORD (OUTPATIENT)
Dept: HEALTH INFORMATION MANAGEMENT | Facility: OTHER | Age: 83
End: 2023-10-26

## 2023-10-26 ENCOUNTER — HOSPITAL ENCOUNTER (OUTPATIENT)
Dept: RADIATION ONCOLOGY | Age: 83
Discharge: STILL ADMITTED OR ADMINISTRATIVE | End: 2023-10-26
Attending: RADIOLOGY

## 2023-10-26 ENCOUNTER — APPOINTMENT (OUTPATIENT)
Dept: RADIATION ONCOLOGY | Age: 83
End: 2023-10-26
Attending: RADIOLOGY

## 2023-10-26 LAB
RAD ONC ARIA COURSE ID: NORMAL
RAD ONC ARIA COURSE LAST TREATMENT DATE: NORMAL
RAD ONC ARIA COURSE START DATE: NORMAL
RAD ONC ARIA COURSE TREATMENT ELAPSED DAYS: 17
RAD ONC ARIA FIRST TREATMENT DATE: NORMAL
RAD ONC ARIA PLAN FRACTIONS TREATED TO DATE: 13
RAD ONC ARIA PLAN ID: NORMAL
RAD ONC ARIA PLAN PRESCRIBED DOSE PER FRACTION: 2 GY
RAD ONC ARIA PLAN PRIMARY REFERENCE POINT: NORMAL
RAD ONC ARIA PLAN TOTAL FRACTIONS PRESCRIBED: 25
RAD ONC ARIA PLAN TOTAL PRESCRIBED DOSE: 5000 CGY
RAD ONC ARIA REFERENCE POINT DOSAGE GIVEN TO DATE: 26 GY
RAD ONC ARIA REFERENCE POINT ID: NORMAL
RAD ONC ARIA REFERENCE POINT SESSION DOSAGE GIVEN: 2 GY

## 2023-10-26 PROCEDURE — 77412 RADIATION TX DELIVERY LVL 3: CPT | Performed by: RADIOLOGY

## 2023-10-27 ENCOUNTER — APPOINTMENT (OUTPATIENT)
Dept: RADIATION ONCOLOGY | Age: 83
End: 2023-10-27
Attending: RADIOLOGY

## 2023-10-27 ENCOUNTER — HOSPITAL ENCOUNTER (OUTPATIENT)
Dept: RADIATION ONCOLOGY | Age: 83
Discharge: STILL ADMITTED OR ADMINISTRATIVE | End: 2023-10-27
Attending: RADIOLOGY

## 2023-10-27 VITALS
HEART RATE: 58 BPM | DIASTOLIC BLOOD PRESSURE: 56 MMHG | SYSTOLIC BLOOD PRESSURE: 137 MMHG | OXYGEN SATURATION: 100 % | RESPIRATION RATE: 16 BRPM

## 2023-10-27 LAB
RAD ONC ARIA COURSE ID: NORMAL
RAD ONC ARIA COURSE LAST TREATMENT DATE: NORMAL
RAD ONC ARIA COURSE START DATE: NORMAL
RAD ONC ARIA COURSE TREATMENT ELAPSED DAYS: 18
RAD ONC ARIA FIRST TREATMENT DATE: NORMAL
RAD ONC ARIA PLAN FRACTIONS TREATED TO DATE: 14
RAD ONC ARIA PLAN ID: NORMAL
RAD ONC ARIA PLAN PRESCRIBED DOSE PER FRACTION: 2 GY
RAD ONC ARIA PLAN PRIMARY REFERENCE POINT: NORMAL
RAD ONC ARIA PLAN TOTAL FRACTIONS PRESCRIBED: 25
RAD ONC ARIA PLAN TOTAL PRESCRIBED DOSE: 5000 CGY
RAD ONC ARIA REFERENCE POINT DOSAGE GIVEN TO DATE: 28 GY
RAD ONC ARIA REFERENCE POINT ID: NORMAL
RAD ONC ARIA REFERENCE POINT SESSION DOSAGE GIVEN: 2 GY

## 2023-10-27 PROCEDURE — 77412 RADIATION TX DELIVERY LVL 3: CPT | Performed by: RADIOLOGY

## 2023-10-27 ASSESSMENT — PAIN SCALES - GENERAL: PAINLEVEL: 0

## 2023-10-30 ENCOUNTER — APPOINTMENT (OUTPATIENT)
Dept: RADIATION ONCOLOGY | Age: 83
End: 2023-10-30
Attending: RADIOLOGY

## 2023-10-30 ENCOUNTER — HOSPITAL ENCOUNTER (OUTPATIENT)
Dept: RADIATION ONCOLOGY | Age: 83
Discharge: STILL ADMITTED OR ADMINISTRATIVE | End: 2023-10-30
Attending: RADIOLOGY

## 2023-10-30 LAB
RAD ONC ARIA COURSE ID: NORMAL
RAD ONC ARIA COURSE LAST TREATMENT DATE: NORMAL
RAD ONC ARIA COURSE START DATE: NORMAL
RAD ONC ARIA COURSE TREATMENT ELAPSED DAYS: 21
RAD ONC ARIA FIRST TREATMENT DATE: NORMAL
RAD ONC ARIA PLAN FRACTIONS TREATED TO DATE: 15
RAD ONC ARIA PLAN ID: NORMAL
RAD ONC ARIA PLAN PRESCRIBED DOSE PER FRACTION: 2 GY
RAD ONC ARIA PLAN PRIMARY REFERENCE POINT: NORMAL
RAD ONC ARIA PLAN TOTAL FRACTIONS PRESCRIBED: 25
RAD ONC ARIA PLAN TOTAL PRESCRIBED DOSE: 5000 CGY
RAD ONC ARIA REFERENCE POINT DOSAGE GIVEN TO DATE: 30 GY
RAD ONC ARIA REFERENCE POINT ID: NORMAL
RAD ONC ARIA REFERENCE POINT SESSION DOSAGE GIVEN: 2 GY

## 2023-10-30 PROCEDURE — 77417 THER RADIOLOGY PORT IMAGE(S): CPT | Performed by: RADIOLOGY

## 2023-10-30 PROCEDURE — 77336 RADIATION PHYSICS CONSULT: CPT | Performed by: RADIOLOGY

## 2023-10-30 PROCEDURE — 77412 RADIATION TX DELIVERY LVL 3: CPT | Performed by: RADIOLOGY

## 2023-10-31 ENCOUNTER — APPOINTMENT (OUTPATIENT)
Dept: RADIATION ONCOLOGY | Age: 83
End: 2023-10-31
Attending: RADIOLOGY

## 2023-10-31 ENCOUNTER — HOSPITAL ENCOUNTER (OUTPATIENT)
Dept: RADIATION ONCOLOGY | Age: 83
Discharge: STILL ADMITTED OR ADMINISTRATIVE | End: 2023-10-31
Attending: RADIOLOGY

## 2023-10-31 LAB
RAD ONC ARIA COURSE ID: NORMAL
RAD ONC ARIA COURSE LAST TREATMENT DATE: NORMAL
RAD ONC ARIA COURSE START DATE: NORMAL
RAD ONC ARIA COURSE TREATMENT ELAPSED DAYS: 22
RAD ONC ARIA FIRST TREATMENT DATE: NORMAL
RAD ONC ARIA PLAN FRACTIONS TREATED TO DATE: 16
RAD ONC ARIA PLAN ID: NORMAL
RAD ONC ARIA PLAN PRESCRIBED DOSE PER FRACTION: 2 GY
RAD ONC ARIA PLAN PRIMARY REFERENCE POINT: NORMAL
RAD ONC ARIA PLAN TOTAL FRACTIONS PRESCRIBED: 25
RAD ONC ARIA PLAN TOTAL PRESCRIBED DOSE: 5000 CGY
RAD ONC ARIA REFERENCE POINT DOSAGE GIVEN TO DATE: 32 GY
RAD ONC ARIA REFERENCE POINT ID: NORMAL
RAD ONC ARIA REFERENCE POINT SESSION DOSAGE GIVEN: 2 GY

## 2023-10-31 PROCEDURE — 77412 RADIATION TX DELIVERY LVL 3: CPT | Performed by: RADIOLOGY

## 2023-11-01 ENCOUNTER — HOSPITAL ENCOUNTER (OUTPATIENT)
Dept: RADIATION ONCOLOGY | Age: 83
Discharge: STILL ADMITTED OR ADMINISTRATIVE | End: 2023-11-01
Attending: RADIOLOGY

## 2023-11-01 ENCOUNTER — APPOINTMENT (OUTPATIENT)
Dept: RADIATION ONCOLOGY | Age: 83
End: 2023-11-01
Attending: RADIOLOGY

## 2023-11-01 LAB
RAD ONC ARIA COURSE ID: NORMAL
RAD ONC ARIA COURSE LAST TREATMENT DATE: NORMAL
RAD ONC ARIA COURSE START DATE: NORMAL
RAD ONC ARIA COURSE TREATMENT ELAPSED DAYS: 23
RAD ONC ARIA FIRST TREATMENT DATE: NORMAL
RAD ONC ARIA PLAN FRACTIONS TREATED TO DATE: 17
RAD ONC ARIA PLAN ID: NORMAL
RAD ONC ARIA PLAN PRESCRIBED DOSE PER FRACTION: 2 GY
RAD ONC ARIA PLAN PRIMARY REFERENCE POINT: NORMAL
RAD ONC ARIA PLAN TOTAL FRACTIONS PRESCRIBED: 25
RAD ONC ARIA PLAN TOTAL PRESCRIBED DOSE: 5000 CGY
RAD ONC ARIA REFERENCE POINT DOSAGE GIVEN TO DATE: 34 GY
RAD ONC ARIA REFERENCE POINT ID: NORMAL
RAD ONC ARIA REFERENCE POINT SESSION DOSAGE GIVEN: 2 GY

## 2023-11-01 PROCEDURE — 77427 RADIATION TX MANAGEMENT X5: CPT | Performed by: RADIOLOGY

## 2023-11-01 PROCEDURE — 77412 RADIATION TX DELIVERY LVL 3: CPT | Performed by: RADIOLOGY

## 2023-11-02 ENCOUNTER — HOSPITAL ENCOUNTER (OUTPATIENT)
Dept: RADIATION ONCOLOGY | Age: 83
Discharge: STILL ADMITTED OR ADMINISTRATIVE | End: 2023-11-02
Attending: RADIOLOGY

## 2023-11-02 ENCOUNTER — APPOINTMENT (OUTPATIENT)
Dept: RADIATION ONCOLOGY | Age: 83
End: 2023-11-02
Attending: RADIOLOGY

## 2023-11-02 LAB
RAD ONC ARIA COURSE ID: NORMAL
RAD ONC ARIA COURSE LAST TREATMENT DATE: NORMAL
RAD ONC ARIA COURSE START DATE: NORMAL
RAD ONC ARIA COURSE TREATMENT ELAPSED DAYS: 24
RAD ONC ARIA FIRST TREATMENT DATE: NORMAL
RAD ONC ARIA PLAN FRACTIONS TREATED TO DATE: 18
RAD ONC ARIA PLAN ID: NORMAL
RAD ONC ARIA PLAN PRESCRIBED DOSE PER FRACTION: 2 GY
RAD ONC ARIA PLAN PRIMARY REFERENCE POINT: NORMAL
RAD ONC ARIA PLAN TOTAL FRACTIONS PRESCRIBED: 25
RAD ONC ARIA PLAN TOTAL PRESCRIBED DOSE: 5000 CGY
RAD ONC ARIA REFERENCE POINT DOSAGE GIVEN TO DATE: 36 GY
RAD ONC ARIA REFERENCE POINT ID: NORMAL
RAD ONC ARIA REFERENCE POINT SESSION DOSAGE GIVEN: 2 GY

## 2023-11-02 PROCEDURE — 77412 RADIATION TX DELIVERY LVL 3: CPT | Performed by: RADIOLOGY

## 2023-11-03 ENCOUNTER — APPOINTMENT (OUTPATIENT)
Dept: RADIATION ONCOLOGY | Age: 83
End: 2023-11-03
Attending: RADIOLOGY

## 2023-11-03 ENCOUNTER — HOSPITAL ENCOUNTER (OUTPATIENT)
Dept: RADIATION ONCOLOGY | Age: 83
Discharge: STILL ADMITTED OR ADMINISTRATIVE | End: 2023-11-03
Attending: RADIOLOGY

## 2023-11-03 VITALS
SYSTOLIC BLOOD PRESSURE: 124 MMHG | DIASTOLIC BLOOD PRESSURE: 70 MMHG | RESPIRATION RATE: 16 BRPM | HEART RATE: 61 BPM | OXYGEN SATURATION: 100 %

## 2023-11-03 LAB
RAD ONC ARIA COURSE ID: NORMAL
RAD ONC ARIA COURSE LAST TREATMENT DATE: NORMAL
RAD ONC ARIA COURSE START DATE: NORMAL
RAD ONC ARIA COURSE TREATMENT ELAPSED DAYS: 25
RAD ONC ARIA FIRST TREATMENT DATE: NORMAL
RAD ONC ARIA PLAN FRACTIONS TREATED TO DATE: 19
RAD ONC ARIA PLAN ID: NORMAL
RAD ONC ARIA PLAN PRESCRIBED DOSE PER FRACTION: 2 GY
RAD ONC ARIA PLAN PRIMARY REFERENCE POINT: NORMAL
RAD ONC ARIA PLAN TOTAL FRACTIONS PRESCRIBED: 25
RAD ONC ARIA PLAN TOTAL PRESCRIBED DOSE: 5000 CGY
RAD ONC ARIA REFERENCE POINT DOSAGE GIVEN TO DATE: 38 GY
RAD ONC ARIA REFERENCE POINT ID: NORMAL
RAD ONC ARIA REFERENCE POINT SESSION DOSAGE GIVEN: 2 GY

## 2023-11-03 PROCEDURE — 77412 RADIATION TX DELIVERY LVL 3: CPT | Performed by: RADIOLOGY

## 2023-11-03 ASSESSMENT — PAIN SCALES - GENERAL: PAINLEVEL: 0

## 2023-11-06 ENCOUNTER — HOSPITAL ENCOUNTER (OUTPATIENT)
Dept: RADIATION ONCOLOGY | Age: 83
Discharge: STILL ADMITTED OR ADMINISTRATIVE | End: 2023-11-06
Attending: RADIOLOGY

## 2023-11-06 ENCOUNTER — APPOINTMENT (OUTPATIENT)
Dept: RADIATION ONCOLOGY | Age: 83
End: 2023-11-06
Attending: RADIOLOGY

## 2023-11-06 LAB
RAD ONC ARIA COURSE ID: NORMAL
RAD ONC ARIA COURSE LAST TREATMENT DATE: NORMAL
RAD ONC ARIA COURSE START DATE: NORMAL
RAD ONC ARIA COURSE TREATMENT ELAPSED DAYS: 28
RAD ONC ARIA FIRST TREATMENT DATE: NORMAL
RAD ONC ARIA PLAN FRACTIONS TREATED TO DATE: 20
RAD ONC ARIA PLAN ID: NORMAL
RAD ONC ARIA PLAN PRESCRIBED DOSE PER FRACTION: 2 GY
RAD ONC ARIA PLAN PRIMARY REFERENCE POINT: NORMAL
RAD ONC ARIA PLAN TOTAL FRACTIONS PRESCRIBED: 25
RAD ONC ARIA PLAN TOTAL PRESCRIBED DOSE: 5000 CGY
RAD ONC ARIA REFERENCE POINT DOSAGE GIVEN TO DATE: 40 GY
RAD ONC ARIA REFERENCE POINT ID: NORMAL
RAD ONC ARIA REFERENCE POINT SESSION DOSAGE GIVEN: 2 GY

## 2023-11-06 PROCEDURE — 77417 THER RADIOLOGY PORT IMAGE(S): CPT | Performed by: RADIOLOGY

## 2023-11-06 PROCEDURE — 77412 RADIATION TX DELIVERY LVL 3: CPT | Performed by: RADIOLOGY

## 2023-11-07 ENCOUNTER — APPOINTMENT (OUTPATIENT)
Dept: RADIATION ONCOLOGY | Age: 83
End: 2023-11-07
Attending: RADIOLOGY

## 2023-11-07 ENCOUNTER — HOSPITAL ENCOUNTER (OUTPATIENT)
Dept: RADIATION ONCOLOGY | Age: 83
Discharge: STILL ADMITTED OR ADMINISTRATIVE | End: 2023-11-07
Attending: RADIOLOGY

## 2023-11-07 LAB
RAD ONC ARIA COURSE ID: NORMAL
RAD ONC ARIA COURSE LAST TREATMENT DATE: NORMAL
RAD ONC ARIA COURSE START DATE: NORMAL
RAD ONC ARIA COURSE TREATMENT ELAPSED DAYS: 29
RAD ONC ARIA FIRST TREATMENT DATE: NORMAL
RAD ONC ARIA PLAN FRACTIONS TREATED TO DATE: 21
RAD ONC ARIA PLAN ID: NORMAL
RAD ONC ARIA PLAN PRESCRIBED DOSE PER FRACTION: 2 GY
RAD ONC ARIA PLAN PRIMARY REFERENCE POINT: NORMAL
RAD ONC ARIA PLAN TOTAL FRACTIONS PRESCRIBED: 25
RAD ONC ARIA PLAN TOTAL PRESCRIBED DOSE: 5000 CGY
RAD ONC ARIA REFERENCE POINT DOSAGE GIVEN TO DATE: 42 GY
RAD ONC ARIA REFERENCE POINT ID: NORMAL
RAD ONC ARIA REFERENCE POINT SESSION DOSAGE GIVEN: 2 GY

## 2023-11-07 PROCEDURE — 77412 RADIATION TX DELIVERY LVL 3: CPT | Performed by: RADIOLOGY

## 2023-11-07 PROCEDURE — 77427 RADIATION TX MANAGEMENT X5: CPT | Performed by: RADIOLOGY

## 2023-11-07 PROCEDURE — 77336 RADIATION PHYSICS CONSULT: CPT | Performed by: RADIOLOGY

## 2023-11-08 ENCOUNTER — HOSPITAL ENCOUNTER (OUTPATIENT)
Dept: RADIATION ONCOLOGY | Age: 83
Discharge: STILL ADMITTED OR ADMINISTRATIVE | End: 2023-11-08
Attending: RADIOLOGY

## 2023-11-08 ENCOUNTER — APPOINTMENT (OUTPATIENT)
Dept: RADIATION ONCOLOGY | Age: 83
End: 2023-11-08
Attending: RADIOLOGY

## 2023-11-08 LAB
RAD ONC ARIA COURSE ID: NORMAL
RAD ONC ARIA COURSE LAST TREATMENT DATE: NORMAL
RAD ONC ARIA COURSE START DATE: NORMAL
RAD ONC ARIA COURSE TREATMENT ELAPSED DAYS: 30
RAD ONC ARIA FIRST TREATMENT DATE: NORMAL
RAD ONC ARIA PLAN FRACTIONS TREATED TO DATE: 22
RAD ONC ARIA PLAN ID: NORMAL
RAD ONC ARIA PLAN PRESCRIBED DOSE PER FRACTION: 2 GY
RAD ONC ARIA PLAN PRIMARY REFERENCE POINT: NORMAL
RAD ONC ARIA PLAN TOTAL FRACTIONS PRESCRIBED: 25
RAD ONC ARIA PLAN TOTAL PRESCRIBED DOSE: 5000 CGY
RAD ONC ARIA REFERENCE POINT DOSAGE GIVEN TO DATE: 44 GY
RAD ONC ARIA REFERENCE POINT ID: NORMAL
RAD ONC ARIA REFERENCE POINT SESSION DOSAGE GIVEN: 2 GY

## 2023-11-08 PROCEDURE — 77412 RADIATION TX DELIVERY LVL 3: CPT | Performed by: RADIOLOGY

## 2023-11-09 ENCOUNTER — APPOINTMENT (OUTPATIENT)
Dept: RADIATION ONCOLOGY | Age: 83
End: 2023-11-09
Attending: RADIOLOGY

## 2023-11-09 ENCOUNTER — HOSPITAL ENCOUNTER (OUTPATIENT)
Dept: RADIATION ONCOLOGY | Age: 83
Discharge: STILL ADMITTED OR ADMINISTRATIVE | End: 2023-11-09
Attending: RADIOLOGY

## 2023-11-09 LAB
RAD ONC ARIA COURSE ID: NORMAL
RAD ONC ARIA COURSE LAST TREATMENT DATE: NORMAL
RAD ONC ARIA COURSE START DATE: NORMAL
RAD ONC ARIA COURSE TREATMENT ELAPSED DAYS: 31
RAD ONC ARIA FIRST TREATMENT DATE: NORMAL
RAD ONC ARIA PLAN FRACTIONS TREATED TO DATE: 23
RAD ONC ARIA PLAN ID: NORMAL
RAD ONC ARIA PLAN PRESCRIBED DOSE PER FRACTION: 2 GY
RAD ONC ARIA PLAN PRIMARY REFERENCE POINT: NORMAL
RAD ONC ARIA PLAN TOTAL FRACTIONS PRESCRIBED: 25
RAD ONC ARIA PLAN TOTAL PRESCRIBED DOSE: 5000 CGY
RAD ONC ARIA REFERENCE POINT DOSAGE GIVEN TO DATE: 46 GY
RAD ONC ARIA REFERENCE POINT ID: NORMAL
RAD ONC ARIA REFERENCE POINT SESSION DOSAGE GIVEN: 2 GY

## 2023-11-09 PROCEDURE — 77412 RADIATION TX DELIVERY LVL 3: CPT | Performed by: RADIOLOGY

## 2023-11-10 ENCOUNTER — APPOINTMENT (OUTPATIENT)
Dept: RADIATION ONCOLOGY | Age: 83
End: 2023-11-10
Attending: RADIOLOGY

## 2023-11-10 ENCOUNTER — HOSPITAL ENCOUNTER (OUTPATIENT)
Dept: RADIATION ONCOLOGY | Age: 83
Discharge: STILL ADMITTED OR ADMINISTRATIVE | End: 2023-11-10
Attending: RADIOLOGY

## 2023-11-10 VITALS
RESPIRATION RATE: 16 BRPM | DIASTOLIC BLOOD PRESSURE: 61 MMHG | HEART RATE: 67 BPM | SYSTOLIC BLOOD PRESSURE: 118 MMHG | OXYGEN SATURATION: 98 % | TEMPERATURE: 96.7 F

## 2023-11-10 LAB
RAD ONC ARIA COURSE ID: NORMAL
RAD ONC ARIA COURSE LAST TREATMENT DATE: NORMAL
RAD ONC ARIA COURSE START DATE: NORMAL
RAD ONC ARIA COURSE TREATMENT ELAPSED DAYS: 32
RAD ONC ARIA FIRST TREATMENT DATE: NORMAL
RAD ONC ARIA PLAN FRACTIONS TREATED TO DATE: 24
RAD ONC ARIA PLAN ID: NORMAL
RAD ONC ARIA PLAN PRESCRIBED DOSE PER FRACTION: 2 GY
RAD ONC ARIA PLAN PRIMARY REFERENCE POINT: NORMAL
RAD ONC ARIA PLAN TOTAL FRACTIONS PRESCRIBED: 25
RAD ONC ARIA PLAN TOTAL PRESCRIBED DOSE: 5000 CGY
RAD ONC ARIA REFERENCE POINT DOSAGE GIVEN TO DATE: 48 GY
RAD ONC ARIA REFERENCE POINT ID: NORMAL
RAD ONC ARIA REFERENCE POINT SESSION DOSAGE GIVEN: 2 GY

## 2023-11-10 PROCEDURE — 77412 RADIATION TX DELIVERY LVL 3: CPT | Performed by: RADIOLOGY

## 2023-11-10 ASSESSMENT — PAIN SCALES - GENERAL: PAINLEVEL: 0

## 2023-11-13 ENCOUNTER — HOSPITAL ENCOUNTER (OUTPATIENT)
Dept: RADIATION ONCOLOGY | Age: 83
Discharge: STILL ADMITTED OR ADMINISTRATIVE | End: 2023-11-13
Attending: RADIOLOGY

## 2023-11-13 ENCOUNTER — APPOINTMENT (OUTPATIENT)
Dept: RADIATION ONCOLOGY | Age: 83
End: 2023-11-13
Attending: RADIOLOGY

## 2023-11-13 LAB
RAD ONC ARIA COURSE END DATE: NORMAL
RAD ONC ARIA COURSE ID: NORMAL
RAD ONC ARIA COURSE ID: NORMAL
RAD ONC ARIA COURSE LAST TREATMENT DATE: NORMAL
RAD ONC ARIA COURSE LAST TREATMENT DATE: NORMAL
RAD ONC ARIA COURSE START DATE: NORMAL
RAD ONC ARIA COURSE START DATE: NORMAL
RAD ONC ARIA COURSE TREATMENT ELAPSED DAYS: 35
RAD ONC ARIA COURSE TREATMENT ELAPSED DAYS: 35
RAD ONC ARIA FIRST TREATMENT DATE: NORMAL
RAD ONC ARIA FIRST TREATMENT DATE: NORMAL
RAD ONC ARIA PLAN FRACTIONS TREATED TO DATE: 25
RAD ONC ARIA PLAN FRACTIONS TREATED TO DATE: 25
RAD ONC ARIA PLAN ID: NORMAL
RAD ONC ARIA PLAN ID: NORMAL
RAD ONC ARIA PLAN NAME: NORMAL
RAD ONC ARIA PLAN PRESCRIBED DOSE PER FRACTION: 2 GY
RAD ONC ARIA PLAN PRESCRIBED DOSE PER FRACTION: 2 GY
RAD ONC ARIA PLAN PRIMARY REFERENCE POINT: NORMAL
RAD ONC ARIA PLAN PRIMARY REFERENCE POINT: NORMAL
RAD ONC ARIA PLAN TOTAL FRACTIONS PRESCRIBED: 25
RAD ONC ARIA PLAN TOTAL FRACTIONS PRESCRIBED: 25
RAD ONC ARIA PLAN TOTAL PRESCRIBED DOSE: 5000 CGY
RAD ONC ARIA PLAN TOTAL PRESCRIBED DOSE: 5000 CGY
RAD ONC ARIA REFERENCE POINT DOSAGE GIVEN TO DATE: 50 GY
RAD ONC ARIA REFERENCE POINT DOSAGE GIVEN TO DATE: 50 GY
RAD ONC ARIA REFERENCE POINT ID: NORMAL
RAD ONC ARIA REFERENCE POINT ID: NORMAL
RAD ONC ARIA REFERENCE POINT SESSION DOSAGE GIVEN: 2 GY

## 2023-11-13 PROCEDURE — 77336 RADIATION PHYSICS CONSULT: CPT | Performed by: RADIOLOGY

## 2023-11-13 PROCEDURE — 77412 RADIATION TX DELIVERY LVL 3: CPT | Performed by: RADIOLOGY

## 2023-11-13 PROCEDURE — 77417 THER RADIOLOGY PORT IMAGE(S): CPT | Performed by: RADIOLOGY

## 2023-11-14 ENCOUNTER — CLINICAL DOCUMENTATION (OUTPATIENT)
Dept: RADIATION ONCOLOGY | Age: 83
End: 2023-11-14

## 2023-11-14 ENCOUNTER — HOSPITAL ENCOUNTER (OUTPATIENT)
Dept: RADIATION ONCOLOGY | Age: 83
End: 2023-11-14
Attending: RADIOLOGY

## 2023-11-14 ENCOUNTER — TELEPHONE (OUTPATIENT)
Dept: ONCOLOGY | Age: 83
End: 2023-11-14

## 2023-11-14 ENCOUNTER — APPOINTMENT (OUTPATIENT)
Dept: RADIATION ONCOLOGY | Age: 83
End: 2023-11-14
Attending: RADIOLOGY

## 2023-11-15 ENCOUNTER — APPOINTMENT (OUTPATIENT)
Dept: RADIATION ONCOLOGY | Age: 83
End: 2023-11-15
Attending: RADIOLOGY

## 2023-11-16 ENCOUNTER — APPOINTMENT (OUTPATIENT)
Dept: RADIATION ONCOLOGY | Age: 83
End: 2023-11-16
Attending: RADIOLOGY

## 2023-11-17 ENCOUNTER — APPOINTMENT (OUTPATIENT)
Dept: RADIATION ONCOLOGY | Age: 83
End: 2023-11-17
Attending: RADIOLOGY

## 2023-11-21 ENCOUNTER — TELEPHONE (OUTPATIENT)
Dept: RADIATION ONCOLOGY | Age: 83
End: 2023-11-21

## 2023-11-22 ENCOUNTER — CLINICAL DOCUMENTATION (OUTPATIENT)
Dept: RADIATION ONCOLOGY | Age: 83
End: 2023-11-22

## 2023-11-22 DIAGNOSIS — C80.1 MALIGNANT NEOPLASM (CMD): Primary | ICD-10-CM

## 2023-12-14 ENCOUNTER — EXTERNAL RECORD (OUTPATIENT)
Dept: HEALTH INFORMATION MANAGEMENT | Facility: OTHER | Age: 83
End: 2023-12-14

## 2023-12-22 ENCOUNTER — CANCER NAVIGATOR (OUTPATIENT)
Dept: ONCOLOGY | Age: 83
End: 2023-12-22

## 2023-12-26 ENCOUNTER — APPOINTMENT (OUTPATIENT)
Dept: RADIATION ONCOLOGY | Age: 83
End: 2023-12-26
Attending: RADIOLOGY

## 2023-12-28 ENCOUNTER — HOSPITAL ENCOUNTER (OUTPATIENT)
Dept: RADIATION ONCOLOGY | Age: 83
Discharge: HOME | End: 2023-12-28

## 2023-12-28 ENCOUNTER — CLINICAL ABSTRACT (OUTPATIENT)
Dept: HEALTH INFORMATION MANAGEMENT | Facility: OTHER | Age: 83
End: 2023-12-28

## 2023-12-28 VITALS
HEART RATE: 61 BPM | SYSTOLIC BLOOD PRESSURE: 147 MMHG | BODY MASS INDEX: 19.61 KG/M2 | WEIGHT: 122 LBS | OXYGEN SATURATION: 95 % | RESPIRATION RATE: 16 BRPM | HEIGHT: 66 IN | DIASTOLIC BLOOD PRESSURE: 75 MMHG

## 2023-12-28 ASSESSMENT — PAIN SCALES - GENERAL: PAINLEVEL: 3

## 2024-01-25 ENCOUNTER — EXTERNAL RECORD (OUTPATIENT)
Dept: HEALTH INFORMATION MANAGEMENT | Facility: OTHER | Age: 84
End: 2024-01-25

## 2024-03-07 ENCOUNTER — EXTERNAL RECORD (OUTPATIENT)
Dept: HEALTH INFORMATION MANAGEMENT | Facility: OTHER | Age: 84
End: 2024-03-07

## 2024-04-18 ENCOUNTER — EXTERNAL RECORD (OUTPATIENT)
Dept: HEALTH INFORMATION MANAGEMENT | Facility: OTHER | Age: 84
End: 2024-04-18

## 2024-05-30 ENCOUNTER — EXTERNAL RECORD (OUTPATIENT)
Dept: HEALTH INFORMATION MANAGEMENT | Facility: OTHER | Age: 84
End: 2024-05-30

## 2024-06-27 ENCOUNTER — APPOINTMENT (OUTPATIENT)
Dept: RADIATION ONCOLOGY | Age: 84
End: 2024-06-27

## 2024-06-27 ENCOUNTER — CLINICAL ABSTRACT (OUTPATIENT)
Dept: HEALTH INFORMATION MANAGEMENT | Facility: OTHER | Age: 84
End: 2024-06-27

## 2024-07-11 ENCOUNTER — EXTERNAL RECORD (OUTPATIENT)
Dept: HEALTH INFORMATION MANAGEMENT | Facility: OTHER | Age: 84
End: 2024-07-11

## 2024-08-01 ENCOUNTER — EXTERNAL RECORD (OUTPATIENT)
Dept: HEALTH INFORMATION MANAGEMENT | Facility: OTHER | Age: 84
End: 2024-08-01

## 2024-08-27 DIAGNOSIS — R19.7 DIARRHEA: Primary | ICD-10-CM

## 2024-08-28 ENCOUNTER — HOSPITAL ENCOUNTER (OUTPATIENT)
Dept: RADIATION ONCOLOGY | Age: 84
Discharge: HOME | End: 2024-08-28
Attending: RADIOLOGY

## 2024-08-28 VITALS
OXYGEN SATURATION: 99 % | HEART RATE: 56 BPM | DIASTOLIC BLOOD PRESSURE: 64 MMHG | RESPIRATION RATE: 16 BRPM | BODY MASS INDEX: 18.36 KG/M2 | SYSTOLIC BLOOD PRESSURE: 121 MMHG | WEIGHT: 113.76 LBS

## 2024-08-28 DIAGNOSIS — R19.7 DIARRHEA: Primary | ICD-10-CM

## 2024-08-28 PROCEDURE — 99212 OFFICE O/P EST SF 10 MIN: CPT

## 2024-08-28 ASSESSMENT — PAIN SCALES - GENERAL: PAINLEVEL: 0
